# Patient Record
Sex: MALE | Race: WHITE | Employment: FULL TIME | URBAN - METROPOLITAN AREA
[De-identification: names, ages, dates, MRNs, and addresses within clinical notes are randomized per-mention and may not be internally consistent; named-entity substitution may affect disease eponyms.]

---

## 2017-06-21 ENCOUNTER — APPOINTMENT (OUTPATIENT)
Dept: GENERAL RADIOLOGY | Age: 47
End: 2017-06-21
Attending: EMERGENCY MEDICINE
Payer: COMMERCIAL

## 2017-06-21 ENCOUNTER — HOSPITAL ENCOUNTER (OUTPATIENT)
Age: 47
Discharge: HOME | End: 2017-06-21
Attending: EMERGENCY MEDICINE
Payer: COMMERCIAL

## 2017-06-21 VITALS
DIASTOLIC BLOOD PRESSURE: 89 MMHG | RESPIRATION RATE: 16 BRPM | BODY MASS INDEX: 24.38 KG/M2 | TEMPERATURE: 99 F | SYSTOLIC BLOOD PRESSURE: 146 MMHG | WEIGHT: 180 LBS | OXYGEN SATURATION: 98 % | HEIGHT: 72 IN | HEART RATE: 64 BPM

## 2017-06-21 DIAGNOSIS — L97.521 FOOT ULCER, LEFT, LIMITED TO BREAKDOWN OF SKIN (HCC): ICD-10-CM

## 2017-06-21 DIAGNOSIS — L03.116 CELLULITIS OF LEFT LOWER EXTREMITY: Primary | ICD-10-CM

## 2017-06-21 PROCEDURE — 99204 OFFICE O/P NEW MOD 45 MIN: CPT

## 2017-06-21 PROCEDURE — 96365 THER/PROPH/DIAG IV INF INIT: CPT

## 2017-06-21 PROCEDURE — 87147 CULTURE TYPE IMMUNOLOGIC: CPT | Performed by: EMERGENCY MEDICINE

## 2017-06-21 PROCEDURE — 87070 CULTURE OTHR SPECIMN AEROBIC: CPT | Performed by: EMERGENCY MEDICINE

## 2017-06-21 PROCEDURE — 87186 SC STD MICRODIL/AGAR DIL: CPT | Performed by: EMERGENCY MEDICINE

## 2017-06-21 PROCEDURE — 73630 X-RAY EXAM OF FOOT: CPT | Performed by: EMERGENCY MEDICINE

## 2017-06-21 PROCEDURE — 87205 SMEAR GRAM STAIN: CPT | Performed by: EMERGENCY MEDICINE

## 2017-06-21 PROCEDURE — S0077 INJECTION, CLINDAMYCIN PHOSP: HCPCS

## 2017-06-21 RX ORDER — CLINDAMYCIN HYDROCHLORIDE 300 MG/1
300 CAPSULE ORAL 4 TIMES DAILY
Qty: 40 CAPSULE | Refills: 0 | Status: SHIPPED | OUTPATIENT
Start: 2017-06-21 | End: 2017-06-23

## 2017-06-21 RX ORDER — IBUPROFEN 200 MG
200 TABLET ORAL EVERY 6 HOURS PRN
COMMUNITY
End: 2018-08-22 | Stop reason: ALTCHOICE

## 2017-06-21 RX ORDER — CLINDAMYCIN PHOSPHATE 600 MG/50ML
600 INJECTION INTRAVENOUS ONCE
Status: COMPLETED | OUTPATIENT
Start: 2017-06-21 | End: 2017-06-21

## 2017-06-21 NOTE — ED INITIAL ASSESSMENT (HPI)
Foot ulcer to left foot at the arch that thought was healed up from 6 months ago. Then noticed last Thursday next to where the ulcer was a type of blister that looked full of blood. That has since opened on Monday and has been draining.  Now having some swe

## 2017-06-21 NOTE — ED PROVIDER NOTES
Patient presents with:  Cellulitis (integumentary, infectious)    HPI:     Lewis Ocampo is a 48year old male who presents with chief complaint of foot infection.   Hx of spina bifida, L ankle fusion and prior toe/metatarsal amputations due to osteomyeliti (As transcribed by Technologist)  Patient complains of medial plantar abscess by the arch of the left foot with redness and swelling. Patient has a history of multiple foot surgeries.     FINDINGS:   There are postsurgical changes involving the distal left

## 2017-06-23 RX ORDER — CEPHALEXIN 500 MG/1
500 CAPSULE ORAL 4 TIMES DAILY
Qty: 40 CAPSULE | Refills: 0 | Status: SHIPPED | OUTPATIENT
Start: 2017-06-23 | End: 2017-07-03

## 2017-06-23 NOTE — ED NOTES
Pt called today with complaint that clindamycin is giving him bad diarrhea and gas. States wound is much better but he is very uncomfortable. Discussed symptoms with Dr Hali Stern. Culture sensitivity is not resulted yet.  Will change pt to keflex and await se

## 2017-06-24 NOTE — ED NOTES
Trying to reach patient, however, his mailbox for phone is full and unable to leave message. Called his sister he has listed on his contacts in the chart and left a message to call back if she knows of any other number to try.  Will continue to try and call

## 2017-06-26 ENCOUNTER — OFFICE VISIT (OUTPATIENT)
Dept: FAMILY MEDICINE CLINIC | Facility: CLINIC | Age: 47
End: 2017-06-26

## 2017-06-26 ENCOUNTER — TELEPHONE (OUTPATIENT)
Dept: FAMILY MEDICINE CLINIC | Facility: CLINIC | Age: 47
End: 2017-06-26

## 2017-06-26 VITALS
BODY MASS INDEX: 26.88 KG/M2 | RESPIRATION RATE: 16 BRPM | SYSTOLIC BLOOD PRESSURE: 130 MMHG | HEIGHT: 71 IN | TEMPERATURE: 99 F | WEIGHT: 192 LBS | DIASTOLIC BLOOD PRESSURE: 86 MMHG | HEART RATE: 82 BPM

## 2017-06-26 DIAGNOSIS — R19.7 DIARRHEA, UNSPECIFIED TYPE: Primary | ICD-10-CM

## 2017-06-26 DIAGNOSIS — L02.619 CELLULITIS AND ABSCESS OF FOOT: ICD-10-CM

## 2017-06-26 DIAGNOSIS — L03.119 CELLULITIS AND ABSCESS OF FOOT: ICD-10-CM

## 2017-06-26 PROCEDURE — 99203 OFFICE O/P NEW LOW 30 MIN: CPT | Performed by: FAMILY MEDICINE

## 2017-06-26 NOTE — PROGRESS NOTES
HPI:    Patient ID: Vinnie Salas is a 48year old male. Patient presents with: Follow - Up: left foot cellulitis-IC visit, stopped ABX    HPI  F/u on cellulitis of left foot. Started on clindamycin but discontinued after 1 day due to diarrhea.  Then med Constitutional: He appears well-nourished. No distress. HENT:   Mouth/Throat: Oropharynx is clear and moist.   Eyes: Conjunctivae are normal.   Cardiovascular: Normal rate. No murmur heard.   Pulmonary/Chest: Effort normal and breath sounds normal.

## 2017-06-26 NOTE — PATIENT INSTRUCTIONS
BRAT diet  Bananas  Rice  Apples  Toast    Chicken broth  Water. Gatorade. 1-2 teaspoons of yogurt. Complete ciprofloxacin course. If loose stools persist, drop off stool sample for testing. Call Dr. Carolee Weeks on Wednesday with update.     Follow up

## 2017-06-26 NOTE — TELEPHONE ENCOUNTER
MARC Bernard  Patient seen in [address] [place] [address] 6/21/17 - dx with cellulitis  Patient has had loose stool since last Thursday. Patient currently taking cipro 500 mg 2x daily. Previously taking clindamycin and keflex but changed due to stomach upset and culture results.

## 2017-07-11 ENCOUNTER — TELEPHONE (OUTPATIENT)
Dept: FAMILY MEDICINE CLINIC | Facility: CLINIC | Age: 47
End: 2017-07-11

## 2017-07-12 NOTE — TELEPHONE ENCOUNTER
[phone] (home)   [facility]. Has wound healed? Diarrhea resolved? Paperwork ready to fax.

## 2017-07-13 ENCOUNTER — MED REC SCAN ONLY (OUTPATIENT)
Dept: FAMILY MEDICINE CLINIC | Facility: CLINIC | Age: 47
End: 2017-07-13

## 2017-07-13 NOTE — TELEPHONE ENCOUNTER
Pt stated his wound is healing. Skin has closed, no drainage, area is scabbing. Pt stated his diarrhea has resolved. Pt stated the BRAT diet helped with the diarrhea. Pt is still looking for a podiatrist that suits his work schedule.   On [facility] paperwork

## 2017-08-01 ENCOUNTER — MED REC SCAN ONLY (OUTPATIENT)
Dept: FAMILY MEDICINE CLINIC | Facility: CLINIC | Age: 47
End: 2017-08-01

## 2018-08-20 ENCOUNTER — TELEPHONE (OUTPATIENT)
Dept: FAMILY MEDICINE CLINIC | Facility: CLINIC | Age: 48
End: 2018-08-20

## 2018-08-22 ENCOUNTER — TELEPHONE (OUTPATIENT)
Dept: FAMILY MEDICINE CLINIC | Facility: CLINIC | Age: 48
End: 2018-08-22

## 2018-08-22 ENCOUNTER — OFFICE VISIT (OUTPATIENT)
Dept: FAMILY MEDICINE CLINIC | Facility: CLINIC | Age: 48
End: 2018-08-22

## 2018-08-22 VITALS
SYSTOLIC BLOOD PRESSURE: 160 MMHG | HEIGHT: 71 IN | DIASTOLIC BLOOD PRESSURE: 98 MMHG | HEART RATE: 78 BPM | BODY MASS INDEX: 27.44 KG/M2 | RESPIRATION RATE: 20 BRPM | TEMPERATURE: 98 F | WEIGHT: 196 LBS

## 2018-08-22 DIAGNOSIS — T14.8XXA BLOOD BLISTER: ICD-10-CM

## 2018-08-22 DIAGNOSIS — T14.8XXA BLOOD BLISTER: Primary | ICD-10-CM

## 2018-08-22 DIAGNOSIS — R03.0 ELEVATED BP WITHOUT DIAGNOSIS OF HYPERTENSION: ICD-10-CM

## 2018-08-22 DIAGNOSIS — L84 CALLUS OF FOOT: Primary | ICD-10-CM

## 2018-08-22 DIAGNOSIS — L84 CALLUS OF FOOT: ICD-10-CM

## 2018-08-22 LAB
EST. AVERAGE GLUCOSE BLD GHB EST-MCNC: 103 MG/DL (ref 68–126)
HBA1C MFR BLD HPLC: 5.2 % (ref ?–5.7)

## 2018-08-22 PROCEDURE — 99213 OFFICE O/P EST LOW 20 MIN: CPT | Performed by: FAMILY MEDICINE

## 2018-08-22 PROCEDURE — 83036 HEMOGLOBIN GLYCOSYLATED A1C: CPT | Performed by: FAMILY MEDICINE

## 2018-08-22 NOTE — TELEPHONE ENCOUNTER
Pt called lvm stated Dr. Kinsey Vela referred him to see Dr. Jacob Par[facility] is not in network with his Ins so he needs to be referred to another podiatrist that is in network.

## 2018-08-22 NOTE — TELEPHONE ENCOUNTER
Call to patient and advised that referral to Dr Corbin Longo was placed. Patient requested referral to Dr Vasile Pickering at [facility] in [place]. States that his insurance is covered there and has been a patient there before.  [id number] [address] [place]

## 2018-08-22 NOTE — TELEPHONE ENCOUNTER
Pt called back stated his old podatrist is in network so he would like the referral sent to [facility] in [place] Dr. Adonis Simpson.

## 2018-08-22 NOTE — PROGRESS NOTES
HPI:    Patient ID: Sarah Sylvester is a 46year old male. Patient presents with:  Blisters: left foot    HPI  Pt with hx of spina bifida here to check on blister on left foot  Onset: 7 days. Resolving. No drainage. Not painful. No other concerns today. (primary encounter diagnosis)  Callus of foot  Elevated bp without diagnosis of hypertension  Blood blister: resolving  Callus: refer to podiatry for tx, Dr. Alissa Guadarrama  BP high: initial and repeat BP high. F/U for RN BP check next week. Monitor.  Low salt die

## 2018-08-22 NOTE — TELEPHONE ENCOUNTER
Call from patient. Patient is IHP. States that Dr Margaretta Felty is not in his network. Would we be able to referral him somewhere else? Advise. Thanks!

## 2018-08-23 DIAGNOSIS — T14.8XXA BLOOD BLISTER: Primary | ICD-10-CM

## 2018-08-23 PROCEDURE — 36415 COLL VENOUS BLD VENIPUNCTURE: CPT | Performed by: FAMILY MEDICINE

## 2018-08-28 ENCOUNTER — NURSE ONLY (OUTPATIENT)
Dept: FAMILY MEDICINE CLINIC | Facility: CLINIC | Age: 48
End: 2018-08-28

## 2018-08-28 VITALS — SYSTOLIC BLOOD PRESSURE: 160 MMHG | DIASTOLIC BLOOD PRESSURE: 88 MMHG

## 2018-08-28 DIAGNOSIS — Z02.9 ENCOUNTERS FOR ADMINISTRATIVE PURPOSE: Primary | ICD-10-CM

## 2018-10-13 ENCOUNTER — PATIENT OUTREACH (OUTPATIENT)
Dept: FAMILY MEDICINE CLINIC | Facility: CLINIC | Age: 48
End: 2018-10-13

## 2018-11-09 ENCOUNTER — OFFICE VISIT (OUTPATIENT)
Dept: WOUND CARE | Facility: HOSPITAL | Age: 48
End: 2018-11-09
Attending: SURGERY
Payer: COMMERCIAL

## 2018-11-09 DIAGNOSIS — L97.522 ULCER OF LEFT FOOT, WITH FAT LAYER EXPOSED (HCC): Primary | ICD-10-CM

## 2018-11-09 PROCEDURE — 97162 PT EVAL MOD COMPLEX 30 MIN: CPT

## 2018-11-09 PROCEDURE — 97597 DBRDMT OPN WND 1ST 20 CM/<: CPT

## 2018-11-09 NOTE — PROGRESS NOTES
Subjective    Chief Complaint  This information was obtained from the patient  11/9/2018 \"I had a wound in the same spot in the past and I used a wound vac and it healed in 2-3 months but it was a lot bigger\".      Allergies  latex (Reaction: rash), radio Wound #1 Medial, Dorsal Foot is an acute Stage 3 Pressure Injury Pressure Ulcer and has received a status of Not Healed.  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

## 2018-11-16 ENCOUNTER — OFFICE VISIT (OUTPATIENT)
Dept: WOUND CARE | Facility: HOSPITAL | Age: 48
End: 2018-11-16
Attending: SURGERY
Payer: COMMERCIAL

## 2018-11-16 DIAGNOSIS — L97.522 ULCER OF LEFT FOOT, WITH FAT LAYER EXPOSED (HCC): Primary | ICD-10-CM

## 2018-11-16 PROCEDURE — 97597 DBRDMT OPN WND 1ST 20 CM/<: CPT

## 2018-11-16 NOTE — PROGRESS NOTES
Subjective    Chief Complaint  This information was obtained from the patient  11/9/2018 \"I had a wound in the same spot in the past and I used a wound vac and it healed in 2-3 months but it was a lot bigger\".  11/16/2018 \"I feel good no problems and I l Wound #1 Medial, Dorsal Foot is an acute Stage 3 Pressure Injury Pressure Ulcer and has received a status of Not Healed.  Initial wound encounter measurements are 1.2cm length x 0.3cm width x 0.3cm depth, with an area of 0.36 sq cm and a volume of 0.108 cub Facility: Outpatient  Debridement Performed for Assessment: Wound #1 Medial, Dorsal Foot  Performed By: Physician ,  Debridement: Selective  Photos  Photo  Post Debridement Measurements  Length: (cm) 1.2  Width: (cm) 0.4  Depth: (cm) 0.3  Stage: Stage 3 Pr

## 2018-11-21 ENCOUNTER — OFFICE VISIT (OUTPATIENT)
Dept: WOUND CARE | Facility: HOSPITAL | Age: 48
End: 2018-11-21
Attending: SURGERY
Payer: COMMERCIAL

## 2018-11-21 DIAGNOSIS — L97.522 ULCER OF LEFT FOOT, WITH FAT LAYER EXPOSED (HCC): Primary | ICD-10-CM

## 2018-11-21 PROCEDURE — 97597 DBRDMT OPN WND 1ST 20 CM/<: CPT

## 2018-11-21 NOTE — PROGRESS NOTES
Subjective    Chief Complaint  This information was obtained from the patient  11/21/2018: Pt notes no changes.      Allergies  latex (Reaction: rash), radiology contrast lodinated, dyes anaphylaxis, shellfish derived, sulfa antibiotics    HPI  This informa The periwound skin exhibited: Callus, Dry/Scaly.  The periwound skin did not exhibit: Brawny Induration, Edema, Excoriation, Induration, Crepitus, Fluctuance, Friable, Rash, Moist, Maceration, Atrophie Juju, Cyanosis, Ecchymosis, Erythema, Hemosiderosis, Applied Secondary Wound Dressing. using Foam lite bordered dressing (1)  Dressing secured with non-allergenic tape/stockinet/wrap. using Medipore (1)  Applied Offloading device.  using 1/4\" foam padding applied to periwound (1)  Selective / Non-selective d

## 2018-11-30 ENCOUNTER — OFFICE VISIT (OUTPATIENT)
Dept: WOUND CARE | Facility: HOSPITAL | Age: 48
End: 2018-11-30
Attending: SURGERY
Payer: COMMERCIAL

## 2018-11-30 PROCEDURE — 97597 DBRDMT OPN WND 1ST 20 CM/<: CPT

## 2018-11-30 NOTE — PROGRESS NOTES
Called to follow up with pt since pt has moved. He will be estab with PCP closer to home. BP checked weekly at wound care and has been 140s/80-90.

## 2018-11-30 NOTE — PROGRESS NOTES
Subjective    Chief Complaint  This information was obtained from the patient  11/21/2018: Pt notes no changes. 11/30/2018 \"I feel pretty good no problems\".      Allergies  latex (Reaction: rash), radiology contrast lodinated, dyes anaphylaxis, shellfish The periwound skin exhibited: Callus, Dry/Scaly.  The periwound skin did not exhibit: Brawny Induration, Edema, Excoriation, Induration, Crepitus, Fluctuance, Friable, Rash, Moist, Maceration, Atrophie Juju, Cyanosis, Ecchymosis, Erythema, Hemosiderosis, Total Area Debrided: (sq cm) 0.21  Volume: (cm³) 0.063  Tissue and other material debrided:  Dermis, Epidermis  Devitalized Tissue Debrided:  Biofilm, Exudate, Fibrin, Necrotic/Eschar, Slough  Instrument: Blade, Forceps  Bleeding: Minimal  Procedural Pain:

## 2018-12-05 ENCOUNTER — TELEPHONE (OUTPATIENT)
Dept: FAMILY MEDICINE CLINIC | Facility: CLINIC | Age: 48
End: 2018-12-05

## 2018-12-05 DIAGNOSIS — L97.522 NON-PRESSURE CHRONIC ULCER OF OTHER PART OF LEFT FOOT WITH FAT LAYER EXPOSED (HCC): Primary | ICD-10-CM

## 2018-12-05 NOTE — TELEPHONE ENCOUNTER
[facility] referral for Mechanical negative pressure for diabetic foot ulcer.   Originally referred by his podiatrist.  Dx - D56.438    Note of 8/28/18 - patient is going to establish with a PCP closer to

## 2018-12-05 NOTE — TELEPHONE ENCOUNTER
Spoke to Villa daya at [facility]. Need to put the order in under Surgery and put details in the note. Per David Christiansen at [facility] - need to make status \"Incomplete\" for review of service prior to authorization.

## 2018-12-07 ENCOUNTER — OFFICE VISIT (OUTPATIENT)
Dept: WOUND CARE | Facility: HOSPITAL | Age: 48
End: 2018-12-07
Attending: SURGERY
Payer: COMMERCIAL

## 2018-12-07 DIAGNOSIS — L97.522 ULCER OF LEFT FOOT, WITH FAT LAYER EXPOSED (HCC): Primary | ICD-10-CM

## 2018-12-07 PROCEDURE — 97597 DBRDMT OPN WND 1ST 20 CM/<: CPT

## 2018-12-14 ENCOUNTER — OFFICE VISIT (OUTPATIENT)
Dept: WOUND CARE | Facility: HOSPITAL | Age: 48
End: 2018-12-14
Attending: SURGERY
Payer: COMMERCIAL

## 2018-12-14 DIAGNOSIS — L97.522 ULCER OF LEFT FOOT, WITH FAT LAYER EXPOSED (HCC): ICD-10-CM

## 2018-12-14 PROCEDURE — 97597 DBRDMT OPN WND 1ST 20 CM/<: CPT

## 2018-12-14 PROCEDURE — 29581 APPL MULTLAYER CMPRN SYS LEG: CPT

## 2018-12-14 NOTE — PROGRESS NOTES
Subjective    Chief Complaint  This information was obtained from the patient  11/21/2018: Pt notes no changes. 11/30/2018 \"I feel pretty good no problems\".  12/7/2018 \" I will ask for additional referrals from my PCP like you asked, the wound looks smal The periwound skin exhibited: Callus, Dry/Scaly.  The periwound skin did not exhibit: Brawny Induration, Edema, Excoriation, Induration, Crepitus, Fluctuance, Friable, Rash, Moist, Maceration, Atrophie Juju, Cyanosis, Ecchymosis, Erythema, Hemosiderosis, Debridement Performed for Assessment: Wound #1 Left, Medial, Dorsal Foot  Performed By: Physician ,  Debridement: Selective  Post Debridement Measurements  Length: (cm) 0.5  Width: (cm) 0.2  Depth: (cm) 3  Stage: Stage 3 Pressure Injury      Area: (cm²) 0.

## 2018-12-21 ENCOUNTER — OFFICE VISIT (OUTPATIENT)
Dept: WOUND CARE | Facility: HOSPITAL | Age: 48
End: 2018-12-21
Attending: NURSE PRACTITIONER
Payer: COMMERCIAL

## 2018-12-21 DIAGNOSIS — L97.522 ULCER OF LEFT FOOT, WITH FAT LAYER EXPOSED (HCC): Primary | ICD-10-CM

## 2018-12-21 PROCEDURE — 97597 DBRDMT OPN WND 1ST 20 CM/<: CPT

## 2018-12-21 NOTE — PROGRESS NOTES
Chief Complaint  This information was obtained from the patient  11/21/2018: Pt notes no changes. 11/30/2018 \"I feel pretty good no problems\". 12/7/2018 \" I will ask for additional referrals from my PCP like you asked, the wound looks smaller to me\".  1 Wound #1 Left, Medial, Dorsal Foot is an acute Stage 3 Pressure Injury Pressure Ulcer and has received a status of Not Healed.  Subsequent wound encounter measurements are 0.6cm length x 0.2cm width x 0.3cm depth, with an area of 0.12 sq cm and a volume of Wound #1 (Pressure Ulcer) is located on the left, medial, dorsal foot. A selective debridement with a total area debrided of 0.21 sq cm Dermis and Epidermis were removed along with devitalized tissue: biofilm, callus, exudate, fibrin, and slough.  The follo

## 2018-12-28 ENCOUNTER — OFFICE VISIT (OUTPATIENT)
Dept: WOUND CARE | Facility: HOSPITAL | Age: 48
End: 2018-12-28
Attending: NURSE PRACTITIONER
Payer: COMMERCIAL

## 2018-12-28 DIAGNOSIS — L97.522 ULCER OF LEFT FOOT, WITH FAT LAYER EXPOSED (HCC): Primary | ICD-10-CM

## 2018-12-28 PROCEDURE — 97597 DBRDMT OPN WND 1ST 20 CM/<: CPT

## 2018-12-28 NOTE — PROGRESS NOTES
Subjective    Chief Complaint  This information was obtained from the patient  12/28/2018 \"The foot is a little less wet but still the dressing gets damp\".      Allergies  latex (Reaction: rash), radiology contrast lodinated, dyes anaphylaxis, shellfish d The periwound skin exhibited: Callus, Moist, Maceration.  The periwound skin did not exhibit: Brawny Induration, Edema, Excoriation, Induration, Crepitus, Fluctuance, Friable, Rash, Dry/Scaly, Atrophie Juju, Cyanosis, Ecchymosis, Erythema, Hemosiderosis, Total Area Debrided: (sq cm) 0.1  Volume: (cm³) 0.01  Tissue and other material debrided:  Dermis, Epidermis  Devitalized Tissue Debrided:  Callus, Exudate, Fibrin, Necrotic/Eschar, Slough  Instrument: Curette  Bleeding:  Moderate  Hemostasis Achieved: Silv

## 2019-01-04 ENCOUNTER — OFFICE VISIT (OUTPATIENT)
Dept: WOUND CARE | Facility: HOSPITAL | Age: 49
End: 2019-01-04
Attending: NURSE PRACTITIONER
Payer: COMMERCIAL

## 2019-01-04 DIAGNOSIS — L97.522 ULCER OF LEFT FOOT, WITH FAT LAYER EXPOSED (HCC): Primary | ICD-10-CM

## 2019-01-04 PROCEDURE — 97597 DBRDMT OPN WND 1ST 20 CM/<: CPT

## 2019-01-04 NOTE — PROGRESS NOTES
Subjective    Chief Complaint  This information was obtained from the patient  12/28/2018 \"The foot is a little less wet but still the dressing gets damp\".  1/4/2019 \"I feel ok and the wound looks smaller, not sure why my foot is moist I do not remember The periwound skin exhibited: Callus, Moist, Maceration.  The periwound skin did not exhibit: Brawny Induration, Edema, Excoriation, Induration, Crepitus, Fluctuance, Friable, Rash, Dry/Scaly, Atrophie Juju, Cyanosis, Ecchymosis, Erythema, Hemosiderosis, Post Procedural Pain: Insensate  Response to Treatment: Procedure was tolerated well      Entered By: Lilly Vizcaino on 46/[id number]/7638 3:37:23 PM   Signature(s): Date(s):

## 2019-01-11 ENCOUNTER — OFFICE VISIT (OUTPATIENT)
Dept: WOUND CARE | Facility: HOSPITAL | Age: 49
End: 2019-01-11
Attending: NURSE PRACTITIONER
Payer: COMMERCIAL

## 2019-01-11 DIAGNOSIS — L97.522 ULCER OF LEFT FOOT, WITH FAT LAYER EXPOSED (HCC): Primary | ICD-10-CM

## 2019-01-11 PROCEDURE — 97597 DBRDMT OPN WND 1ST 20 CM/<: CPT

## 2019-01-11 NOTE — PROGRESS NOTES
Subjective    Chief Complaint  This information was obtained from the patient  12/28/2018 \"The foot is a little less wet but still the dressing gets damp\".  1/4/2019 \"I feel ok and the wound looks smaller, not sure why my foot is moist I do not remember Wound #1 Left, Medial, Dorsal Foot is an acute Stage 3 Pressure Injury Pressure Ulcer and has received a status of Not Healed.  Subsequent wound encounter measurements are 0.2cm length x 0.2cm width x 0.1cm depth, with an area of 0.04 sq cm and a volume of Wound #1 (Pressure Ulcer) is located on the left, medial, dorsal foot. A selective debridement with a total area debrided of 0.04 sq cm was performed by Lázaro Vela PT.  Epidermis was removed along with devitalized tissue: biofilm, callus, exudate, necr

## 2019-01-18 ENCOUNTER — OFFICE VISIT (OUTPATIENT)
Dept: WOUND CARE | Facility: HOSPITAL | Age: 49
End: 2019-01-18
Attending: NURSE PRACTITIONER
Payer: COMMERCIAL

## 2019-01-18 DIAGNOSIS — L97.522 ULCER OF LEFT FOOT, WITH FAT LAYER EXPOSED (HCC): ICD-10-CM

## 2019-01-18 PROCEDURE — 99213 OFFICE O/P EST LOW 20 MIN: CPT

## 2019-01-18 NOTE — PROGRESS NOTES
Subjective    Chief Complaint  This information was obtained from the patient  1/18/2019 \"I feel good and the foot feel more dry than the last couple of weeks\".      Allergies  latex (Reaction: rash), radiology contrast lodinated, dyes anaphylaxis, shellf Pt presents with improved wound area and volume and the exudation is minimal. The wound was debrided of nonviable tissue to promote closure and 100% resolution.  A tiny piece of Fibrocal was applied to the wound bed to promote approximation of the wound edg

## 2019-01-25 ENCOUNTER — OFFICE VISIT (OUTPATIENT)
Dept: WOUND CARE | Facility: HOSPITAL | Age: 49
End: 2019-01-25
Attending: NURSE PRACTITIONER
Payer: COMMERCIAL

## 2019-01-25 DIAGNOSIS — L97.522 ULCER OF LEFT FOOT, WITH FAT LAYER EXPOSED (HCC): Primary | ICD-10-CM

## 2019-01-25 PROCEDURE — 97597 DBRDMT OPN WND 1ST 20 CM/<: CPT

## 2019-01-25 NOTE — PROGRESS NOTES
Subjective    Chief Complaint  This information was obtained from the patient  1/18/2019 \"I feel good and the foot feel more dry than the last couple of weeks\". 1/25/2019 \"The wound is not really draining anymore\".      Allergies  latex (Reaction: rash) The periwound skin exhibited: Callus, Moist, Maceration.  The periwound skin did not exhibit: Brawny Induration, Edema, Excoriation, Induration, Crepitus, Fluctuance, Friable, Rash, Dry/Scaly, Atrophie Juju, Cyanosis, Ecchymosis, Erythema, Hemosiderosis, Biofilm, Callus, Exudate, Fibrin, Necrotic/Eschar, Slough  Instrument: Blade, Curette  Bleeding: None  Procedural Pain: Insensate  Post Procedural Pain: Insensate  Response to Treatment: Procedure was tolerated well      Entered By: Eryn Valdez on 91/01

## 2019-02-01 ENCOUNTER — OFFICE VISIT (OUTPATIENT)
Dept: WOUND CARE | Facility: HOSPITAL | Age: 49
End: 2019-02-01
Attending: NURSE PRACTITIONER
Payer: COMMERCIAL

## 2019-02-01 DIAGNOSIS — L97.522 ULCER OF LEFT FOOT, WITH FAT LAYER EXPOSED (HCC): Primary | ICD-10-CM

## 2019-02-01 PROCEDURE — 99213 OFFICE O/P EST LOW 20 MIN: CPT

## 2019-02-01 NOTE — PROGRESS NOTES
Subjective    Chief Complaint  This information was obtained from the patient  1/18/2019 \"I feel good and the foot feel more dry than the last couple of weeks\". 1/25/2019 \"The wound is not really draining anymore\".  2/1/2019 \"It does not seem to have a Wound #1 Left, Medial, Dorsal Foot is an acute Stage 3 Pressure Injury Pressure Ulcer and has received a status of Not Healed.  Subsequent wound encounter measurements are 0.2cm length x 0.3cm width x 0.1cm depth, with an area of 0.06 sq cm and a volume of

## 2019-02-08 ENCOUNTER — OFFICE VISIT (OUTPATIENT)
Dept: WOUND CARE | Facility: HOSPITAL | Age: 49
End: 2019-02-08
Attending: NURSE PRACTITIONER
Payer: COMMERCIAL

## 2019-02-08 DIAGNOSIS — L97.522 ULCER OF LEFT FOOT, WITH FAT LAYER EXPOSED (HCC): Primary | ICD-10-CM

## 2019-02-08 PROCEDURE — 99213 OFFICE O/P EST LOW 20 MIN: CPT

## 2019-02-08 NOTE — PROGRESS NOTES
Subjective    Chief Complaint  This information was obtained from the patient  1/18/2019 \"I feel good and the foot feel more dry than the last couple of weeks\". 1/25/2019 \"The wound is not really draining anymore\".  2/1/2019 \"It does not seem to have a The periwound skin exhibited: Callus, Moist. The periwound skin did not exhibit: Brawny Induration, Edema, Excoriation, Induration, Crepitus, Fluctuance, Friable, Rash, Dry/Scaly, Maceration, Atrophie Juju, Cyanosis, Ecchymosis, Erythema, Hemosiderosis,

## 2019-02-15 ENCOUNTER — APPOINTMENT (OUTPATIENT)
Dept: WOUND CARE | Facility: HOSPITAL | Age: 49
End: 2019-02-15
Attending: NURSE PRACTITIONER
Payer: COMMERCIAL

## 2019-02-22 ENCOUNTER — APPOINTMENT (OUTPATIENT)
Dept: WOUND CARE | Facility: HOSPITAL | Age: 49
End: 2019-02-22
Attending: NURSE PRACTITIONER
Payer: COMMERCIAL

## 2019-03-14 ENCOUNTER — OFFICE VISIT (OUTPATIENT)
Dept: FAMILY MEDICINE CLINIC | Facility: CLINIC | Age: 49
End: 2019-03-14

## 2019-03-14 DIAGNOSIS — M20.41 HAMMER TOE OF RIGHT FOOT: Primary | ICD-10-CM

## 2019-03-14 DIAGNOSIS — D49.2 ABNORMAL SKIN GROWTH: ICD-10-CM

## 2019-03-14 DIAGNOSIS — L97.509 CHRONIC FOOT ULCER, UNSPECIFIED LATERALITY, UNSPECIFIED ULCER STAGE (HCC): ICD-10-CM

## 2019-03-14 PROCEDURE — 87205 SMEAR GRAM STAIN: CPT | Performed by: FAMILY MEDICINE

## 2019-03-14 PROCEDURE — 87186 SC STD MICRODIL/AGAR DIL: CPT | Performed by: FAMILY MEDICINE

## 2019-03-14 PROCEDURE — 87070 CULTURE OTHR SPECIMN AEROBIC: CPT | Performed by: FAMILY MEDICINE

## 2019-03-14 PROCEDURE — 99213 OFFICE O/P EST LOW 20 MIN: CPT | Performed by: FAMILY MEDICINE

## 2019-03-14 PROCEDURE — 87077 CULTURE AEROBIC IDENTIFY: CPT | Performed by: FAMILY MEDICINE

## 2019-03-14 NOTE — PROGRESS NOTES
HPI:    Patient ID: Gilma Jimenez is a 46year old male. Patient presents with: Other: growth above right eye per patient     HPI  Growth above right eye brow. Onset: 4-5 months. Has gradually doubled in size.   Goes through cycles of bleeding-scabbing o ASSESSMENT/PLAN:   Hammer toe of right foot  (primary encounter diagnosis)  Chronic foot ulcer, unspecified laterality, unspecified ulcer stage (hcc)  Abnormal skin growth  Wound cx collected. Does not appear infected at this time. Chronic issue.   Refe

## 2019-03-22 VITALS
OXYGEN SATURATION: 96 % | BODY MASS INDEX: 27 KG/M2 | RESPIRATION RATE: 16 BRPM | HEART RATE: 76 BPM | TEMPERATURE: 99 F | SYSTOLIC BLOOD PRESSURE: 156 MMHG | DIASTOLIC BLOOD PRESSURE: 101 MMHG | WEIGHT: 190 LBS

## 2019-03-22 RX ORDER — LEVOFLOXACIN 500 MG/1
500 TABLET, FILM COATED ORAL DAILY
Qty: 7 TABLET | Refills: 0 | Status: SHIPPED | OUTPATIENT
Start: 2019-03-22 | End: 2019-03-29

## 2019-03-26 ENCOUNTER — LAB REQUISITION (OUTPATIENT)
Dept: LAB | Facility: HOSPITAL | Age: 49
End: 2019-03-26
Payer: COMMERCIAL

## 2019-03-26 PROCEDURE — 88305 TISSUE EXAM BY PATHOLOGIST: CPT | Performed by: DERMATOLOGY

## 2019-04-09 ENCOUNTER — TELEPHONE (OUTPATIENT)
Dept: FAMILY MEDICINE CLINIC | Facility: CLINIC | Age: 49
End: 2019-04-09

## 2019-04-09 DIAGNOSIS — C44.310 BASAL CELL CARCINOMA (BCC) OF SKIN OF FACE, UNSPECIFIED PART OF FACE: Primary | ICD-10-CM

## 2019-04-09 NOTE — TELEPHONE ENCOUNTER
Referral placed - patient has an appointment  Dr. Dasia Perry Thursday at [address] [place] [address] over eye  Referral pending approval with insurance. Awaiting determination - will fax once approval received.

## 2019-04-09 NOTE — TELEPHONE ENCOUNTER
Pt called stated he needs a referral to the dermatologist sent to the [employer] and [place] for consultation [place] [id number] and this needs to be faxed to [phone].

## 2019-04-10 NOTE — TELEPHONE ENCOUNTER
Called [place] in referrals. He said the referral will likely have to go to medical review because Dr. Paul Ohara is not in network with P. He recommended I contact the pt to notify him he will have to reschedule. Pt notified.   He will reschedule his appt

## 2019-04-10 NOTE — TELEPHONE ENCOUNTER
Patient asking if approval comes in that he be called. Office will reschedule his appointment if they don't have the approval by 1pm today.

## 2019-04-16 ENCOUNTER — TELEPHONE (OUTPATIENT)
Dept: FAMILY MEDICINE CLINIC | Facility: CLINIC | Age: 49
End: 2019-04-16

## 2019-04-16 DIAGNOSIS — C44.310 BASAL CELL CARCINOMA (BCC) OF SKIN OF FACE, UNSPECIFIED PART OF FACE: Primary | ICD-10-CM

## 2019-04-18 NOTE — TELEPHONE ENCOUNTER
Patient referred to Dr. Errol Alegre for basal cell carcinoma of the face  Medical director wondering if patient can be referred to an in network provider  Okay to refer to Dr. Brittany Banegas - plastics for this issue?     Per consult note Dr. Imelda Dacosta (dermatology) on 3

## 2019-04-19 NOTE — TELEPHONE ENCOUNTER
We need to check with in network derm on who specializes in Mohs procedure. We can check Dr. Dc Najera if they handle these cases.   We can also ask his current derm for recs

## 2019-04-19 NOTE — TELEPHONE ENCOUNTER
Called [facility] office. Dr. Myriam Beckre does this procedure.   Per Verbal Dr. Chris Renteria okay to place referral.    [facility] - need to notify patient

## 2019-04-30 NOTE — TELEPHONE ENCOUNTER
Patient states he is having the procedure tomorrow with Dr. Isis Heredia  Referral was put in the system already  Patient does not need the referral for Dr. Wesley Neil

## 2019-05-01 ENCOUNTER — TELEPHONE (OUTPATIENT)
Dept: FAMILY MEDICINE CLINIC | Facility: CLINIC | Age: 49
End: 2019-05-01

## 2019-05-01 NOTE — TELEPHONE ENCOUNTER
----- Message from Lance Boeck, RN sent at 5/1/2019 10:26 AM CDT -----  Regarding: Care gap  Due for b/p check

## 2019-05-01 NOTE — TELEPHONE ENCOUNTER
Per DB OV note on 3/14/19, she recommended pt f/u in a few weeks for a physical and fasting BW. Called pt, he said he would have to get back to us because he was busy getting a procedure at this time.

## 2019-05-03 ENCOUNTER — MED REC SCAN ONLY (OUTPATIENT)
Dept: FAMILY MEDICINE CLINIC | Facility: CLINIC | Age: 49
End: 2019-05-03

## 2019-05-21 ENCOUNTER — TELEPHONE (OUTPATIENT)
Dept: FAMILY MEDICINE CLINIC | Facility: CLINIC | Age: 49
End: 2019-05-21

## 2019-08-13 ENCOUNTER — MED REC SCAN ONLY (OUTPATIENT)
Dept: FAMILY MEDICINE CLINIC | Facility: CLINIC | Age: 49
End: 2019-08-13

## 2019-08-26 ENCOUNTER — HOSPITAL ENCOUNTER (OUTPATIENT)
Age: 49
Discharge: HOME | End: 2019-08-26
Attending: FAMILY MEDICINE
Payer: COMMERCIAL

## 2019-08-26 VITALS
HEART RATE: 78 BPM | OXYGEN SATURATION: 98 % | DIASTOLIC BLOOD PRESSURE: 97 MMHG | SYSTOLIC BLOOD PRESSURE: 158 MMHG | RESPIRATION RATE: 16 BRPM | TEMPERATURE: 99 F

## 2019-08-26 DIAGNOSIS — M62.830 SPASM OF LUMBAR PARASPINOUS MUSCLE: Primary | ICD-10-CM

## 2019-08-26 PROCEDURE — 99213 OFFICE O/P EST LOW 20 MIN: CPT

## 2019-08-26 PROCEDURE — 99214 OFFICE O/P EST MOD 30 MIN: CPT

## 2019-08-26 RX ORDER — METAXALONE 800 MG/1
800 TABLET ORAL 3 TIMES DAILY
Qty: 21 TABLET | Refills: 0 | Status: SHIPPED | OUTPATIENT
Start: 2019-08-26 | End: 2019-09-02

## 2019-08-26 RX ORDER — PREDNISONE 20 MG/1
40 TABLET ORAL DAILY
Qty: 10 TABLET | Refills: 0 | Status: SHIPPED | OUTPATIENT
Start: 2019-08-26 | End: 2019-08-31

## 2019-08-26 NOTE — ED PROVIDER NOTES
Patient Seen in: [id number] [facility]    History   Patient presents with:  Back Pain (musculoskeletal)    Stated Complaint: back pain     HPI    **22-year-old male presents to the immediate care today with chief complaints of low back pain, ri 08/26/19 1604 None (Room air)       Current:BP (!) 158/97   Pulse 78   Temp 98.5 °F (36.9 °C) (Temporal)   Resp 16   SpO2 98%         Physical Exam    General: Well-nourished, well hydrated. No acute distress. No pallor.  No tachypnea  HEENT: Head is normoc re-check        Medications Prescribed:  Discharge Medication List as of 8/26/2019  4:13 PM    START taking these medications    predniSONE 20 MG Oral Tab  Take 2 tablets (40 mg total) by mouth daily for 5 days. , Normal, Disp-10 tablet, R-0    Metaxalone (

## 2019-09-05 ENCOUNTER — PATIENT OUTREACH (OUTPATIENT)
Dept: FAMILY MEDICINE CLINIC | Facility: CLINIC | Age: 49
End: 2019-09-05

## 2020-02-28 ENCOUNTER — TELEPHONE (OUTPATIENT)
Dept: FAMILY MEDICINE CLINIC | Facility: CLINIC | Age: 50
End: 2020-02-28

## 2020-06-23 ENCOUNTER — TELEPHONE (OUTPATIENT)
Dept: FAMILY MEDICINE CLINIC | Facility: CLINIC | Age: 50
End: 2020-06-23

## 2020-06-23 ENCOUNTER — TELEMEDICINE (OUTPATIENT)
Dept: FAMILY MEDICINE CLINIC | Facility: CLINIC | Age: 50
End: 2020-06-23

## 2020-06-23 DIAGNOSIS — J30.89 ALLERGIC RHINITIS DUE TO OTHER ALLERGIC TRIGGER, UNSPECIFIED SEASONALITY: ICD-10-CM

## 2020-06-23 DIAGNOSIS — R03.0 ELEVATED BP WITHOUT DIAGNOSIS OF HYPERTENSION: ICD-10-CM

## 2020-06-23 DIAGNOSIS — J01.00 ACUTE MAXILLARY SINUSITIS, RECURRENCE NOT SPECIFIED: Primary | ICD-10-CM

## 2020-06-23 PROCEDURE — 99213 OFFICE O/P EST LOW 20 MIN: CPT | Performed by: FAMILY MEDICINE

## 2020-06-23 RX ORDER — FLUTICASONE PROPIONATE 50 MCG
2 SPRAY, SUSPENSION (ML) NASAL DAILY
Qty: 1 INHALER | Refills: 1 | Status: SHIPPED | OUTPATIENT
Start: 2020-06-23 | End: 2020-07-23

## 2020-06-23 RX ORDER — AZITHROMYCIN 250 MG/1
TABLET, FILM COATED ORAL
Qty: 6 TABLET | Refills: 0 | Status: SHIPPED | OUTPATIENT
Start: 2020-06-23 | End: 2020-06-28

## 2020-06-23 NOTE — TELEPHONE ENCOUNTER
PT called stated when he lays down it feels like he has fluid in his lungs and starts coughing but when he sits up he feels better. He stated when he increases his physical activity and his nose starts running.  He stated he does get short of breath sometim

## 2020-06-23 NOTE — PROGRESS NOTES
No chief complaint on file. cough and feeling BP is elevated. This visit is conducted using Telemedicine with live, interactive video and audio.     Patient understands and accepts financial responsibility for any deductible, co-insurance and/or co-pays PHYSICAL EXAM:    There were no vitals taken for this visit. Physical Exam       no vitals taken  Patient in no acute distress. He is alert and oriented. He is breathing normal.  ASSESSMENT/PLAN:   1.  Acute maxillary sinusitis, recurrence not specifi

## 2020-06-23 NOTE — TELEPHONE ENCOUNTER
Spoke to patient. Scheduled video visit for today.  Verified insurance  Future Appointments   Date Time Provider Jolene Amrita   6/23/2020  1:00 PM Joanna Amaya MD [facility] verbally consents to a Virtual/Telephone Check-In

## 2020-06-23 NOTE — TELEPHONE ENCOUNTER
Spoke to patient. When he lays down- starts having productive cough with mucous. Also feels like his Bp is elevated when he lays down. Has not taken BP. Also has productive cough with mucous in morning. This has been going on for 4 months.    Has tried

## 2020-12-01 ENCOUNTER — TELEMEDICINE (OUTPATIENT)
Dept: FAMILY MEDICINE CLINIC | Facility: CLINIC | Age: 50
End: 2020-12-01

## 2020-12-01 DIAGNOSIS — Z20.822 SUSPECTED COVID-19 VIRUS INFECTION: Primary | ICD-10-CM

## 2020-12-01 PROCEDURE — 99213 OFFICE O/P EST LOW 20 MIN: CPT | Performed by: FAMILY MEDICINE

## 2020-12-01 NOTE — PROGRESS NOTES
Patient presents with:  Fever       HPI:    Patient ID: Cinthia Staples is a 44year old male doing a video visit today. Sx onset: saturday  Headache and  Fever.  Tmax 100.8  Also has dry cough     No loss of taste/smell  [facility] or ordered in this encounter       Imaging & Referrals:  None

## 2020-12-07 ENCOUNTER — TELEPHONE (OUTPATIENT)
Dept: FAMILY MEDICINE CLINIC | Facility: CLINIC | Age: 50
End: 2020-12-07

## 2020-12-07 RX ORDER — AZITHROMYCIN 250 MG/1
TABLET, FILM COATED ORAL
Qty: 6 TABLET | Refills: 0 | Status: SHIPPED | OUTPATIENT
Start: 2020-12-07 | End: 2020-12-12

## 2020-12-07 NOTE — TELEPHONE ENCOUNTER
Patient states had Televisit 12/1 with Dr Rosi Fong. Got Covid test at state site and it was negative. states he feels a little better than at his televisit, but still having chest congestion, runny nose and productive cough.  States this has been going on sin

## 2020-12-07 NOTE — TELEPHONE ENCOUNTER
Pt did a virtual appointment with Dr. Kiki Valiente on 12/01 concerned about covid. Pt did get his results back he was negative. per pt Dr. Kiki Valiente told him to call back to see what the next steps will be.

## 2020-12-07 NOTE — TELEPHONE ENCOUNTER
Patient states no fever, no sore throat. States has feeling in throat like he keeps having to swallow. Chest congestion, worse in morning after laying down. Productive cough-worse in morning after laying down. Sinus congestion and pressure.

## 2020-12-08 NOTE — TELEPHONE ENCOUNTER
This is likely separate infection than what he had in June. Start zpak. If sx not better, we can eval further. Rx sent. LMTCB.

## 2021-02-22 ENCOUNTER — TELEMEDICINE (OUTPATIENT)
Dept: FAMILY MEDICINE CLINIC | Facility: CLINIC | Age: 51
End: 2021-02-22

## 2021-02-22 DIAGNOSIS — R05.9 COUGH: Primary | ICD-10-CM

## 2021-02-22 PROCEDURE — 99214 OFFICE O/P EST MOD 30 MIN: CPT | Performed by: FAMILY MEDICINE

## 2021-02-22 NOTE — PROGRESS NOTES
Patient presents with:  Cough       HPI:    Patient ID: Preston Baker is a 44year old male doing a video visit to discuss a cough he has has intermittently for 1 year. Cough is mostly in evening and morning. Feels lot of mucus in throat.  Cough is producti sx improve    No orders of the defined types were placed in this encounter.       Meds This Visit:  Requested Prescriptions      No prescriptions requested or ordered in this encounter       Imaging & Referrals:  ALLERGY - INTERNAL

## 2021-02-25 ENCOUNTER — HOSPITAL ENCOUNTER (OUTPATIENT)
Dept: GENERAL RADIOLOGY | Facility: HOSPITAL | Age: 51
Discharge: HOME | End: 2021-02-25
Attending: FAMILY MEDICINE
Payer: COMMERCIAL

## 2021-02-25 DIAGNOSIS — R05.9 COUGH: ICD-10-CM

## 2021-02-25 PROCEDURE — 71046 X-RAY EXAM CHEST 2 VIEWS: CPT | Performed by: FAMILY MEDICINE

## 2021-02-26 ENCOUNTER — TELEPHONE (OUTPATIENT)
Dept: FAMILY MEDICINE CLINIC | Facility: CLINIC | Age: 51
End: 2021-02-26

## 2021-02-26 NOTE — TELEPHONE ENCOUNTER
Pt notified and verbalized understanding. [facility] sent with allergy information.

## 2021-02-26 NOTE — TELEPHONE ENCOUNTER
----- Message from Lina Rhodes MD sent at 2/26/2021 12:44 AM CST -----  CXR wnl. F/U with allergist as discussed

## 2021-03-04 ENCOUNTER — NURSE ONLY (OUTPATIENT)
Dept: ALLERGY | Facility: CLINIC | Age: 51
End: 2021-03-04

## 2021-03-04 ENCOUNTER — TELEPHONE (OUTPATIENT)
Dept: FAMILY MEDICINE CLINIC | Facility: CLINIC | Age: 51
End: 2021-03-04

## 2021-03-04 ENCOUNTER — OFFICE VISIT (OUTPATIENT)
Dept: ALLERGY | Facility: CLINIC | Age: 51
End: 2021-03-04

## 2021-03-04 VITALS
SYSTOLIC BLOOD PRESSURE: 173 MMHG | DIASTOLIC BLOOD PRESSURE: 111 MMHG | RESPIRATION RATE: 18 BRPM | OXYGEN SATURATION: 98 % | HEART RATE: 85 BPM

## 2021-03-04 DIAGNOSIS — J30.89 PERENNIAL ALLERGIC RHINITIS: ICD-10-CM

## 2021-03-04 DIAGNOSIS — R05.3 CHRONIC COUGH: Primary | ICD-10-CM

## 2021-03-04 DIAGNOSIS — R05.3 CHRONIC COUGH: ICD-10-CM

## 2021-03-04 DIAGNOSIS — Z91.09 ENVIRONMENTAL ALLERGIES: ICD-10-CM

## 2021-03-04 PROCEDURE — 95004 PERQ TESTS W/ALRGNC XTRCS: CPT | Performed by: ALLERGY & IMMUNOLOGY

## 2021-03-04 PROCEDURE — 3080F DIAST BP >= 90 MM HG: CPT | Performed by: ALLERGY & IMMUNOLOGY

## 2021-03-04 PROCEDURE — 3077F SYST BP >= 140 MM HG: CPT | Performed by: ALLERGY & IMMUNOLOGY

## 2021-03-04 PROCEDURE — 99244 OFF/OP CNSLTJ NEW/EST MOD 40: CPT | Performed by: ALLERGY & IMMUNOLOGY

## 2021-03-04 RX ORDER — FLUTICASONE PROPIONATE 50 MCG
2 SPRAY, SUSPENSION (ML) NASAL DAILY
Qty: 1 BOTTLE | Refills: 0 | Status: SHIPPED | OUTPATIENT
Start: 2021-03-04

## 2021-03-04 RX ORDER — LEVOCETIRIZINE DIHYDROCHLORIDE 5 MG/1
5 TABLET, FILM COATED ORAL NIGHTLY
Qty: 30 TABLET | Refills: 0 | Status: SHIPPED | OUTPATIENT
Start: 2021-03-04 | End: 2021-10-02

## 2021-03-04 NOTE — TELEPHONE ENCOUNTER
Patient had an allergist appointment today.   BP high at visit  First /11  BP recheck 170/100    Denies all symptoms  Pt denies headache  Denies dizziness  Denies chest pain  Denies SOB   Denies muscle weakness  Patient has a BP cuff at home - patient

## 2021-03-04 NOTE — TELEPHONE ENCOUNTER
Pt went in to an appointment to the allergist and was told his BP was high of 180/110 they took it again it went to 170/100

## 2021-03-04 NOTE — PATIENT INSTRUCTIONS
Recs:  Handouts on chronic cough provided and reviewed including common triggers being postnasal drip  Handouts on rhinitis both allergic and nonallergic rhinitis provided and reviewed  Start Flonase 2 sprays per nostril once a day.   Advised to use on a da

## 2021-03-04 NOTE — PROGRESS NOTES
Kyler Arizmendi is a 44year old male. HPI:   Patient presents with:  Cough: Pt reports a consistent mucousy cough, especially when laying down. Reports that he feels \"like when he is laying down fluid goes to his back and he coughs\".  Reports that he wa History: Social History    Tobacco Use      Smoking status: Never Smoker      Smokeless tobacco: Never Used    Alcohol use: Yes      Comment: social    Drug use: No       Medications (Active prior to today's visit):  No current outpatient medications on fi unusual rashes present  Extremities: no edema, cyanosis, or clubbing  Neurological:Oriented to time, place, person & situation       ASSESSMENT/PLAN:   Assessment   Chronic cough  (primary encounter diagnosis)  Environmental allergies  Perennial allergic r provided today, they were provided solely for patient education and training related to self administration of these medications. Teaching, instruction and sample was provided to the patient by myself.   Teaching included  a review of potential adverse joni

## 2021-03-05 ENCOUNTER — OFFICE VISIT (OUTPATIENT)
Dept: FAMILY MEDICINE CLINIC | Facility: CLINIC | Age: 51
End: 2021-03-05

## 2021-03-05 VITALS
SYSTOLIC BLOOD PRESSURE: 158 MMHG | HEART RATE: 80 BPM | RESPIRATION RATE: 14 BRPM | DIASTOLIC BLOOD PRESSURE: 90 MMHG | WEIGHT: 205 LBS | TEMPERATURE: 99 F | BODY MASS INDEX: 29 KG/M2

## 2021-03-05 DIAGNOSIS — I10 ESSENTIAL HYPERTENSION: Primary | ICD-10-CM

## 2021-03-05 LAB
ALBUMIN SERPL-MCNC: 3.9 G/DL (ref 3.4–5)
ALBUMIN/GLOB SERPL: 1.1 {RATIO} (ref 1–2)
ALP LIVER SERPL-CCNC: 80 U/L
ALT SERPL-CCNC: 22 U/L
ANION GAP SERPL CALC-SCNC: 1 MMOL/L (ref 0–18)
AST SERPL-CCNC: 12 U/L (ref 15–37)
BILIRUB SERPL-MCNC: 0.4 MG/DL (ref 0.1–2)
BUN BLD-MCNC: 16 MG/DL (ref 7–18)
BUN/CREAT SERPL: 16.3 (ref 10–20)
CALCIUM BLD-MCNC: 8.8 MG/DL (ref 8.5–10.1)
CHLORIDE SERPL-SCNC: 107 MMOL/L (ref 98–112)
CO2 SERPL-SCNC: 32 MMOL/L (ref 21–32)
CREAT BLD-MCNC: 0.98 MG/DL
GLOBULIN PLAS-MCNC: 3.5 G/DL (ref 2.8–4.4)
GLUCOSE BLD-MCNC: 94 MG/DL (ref 70–99)
M PROTEIN MFR SERPL ELPH: 7.4 G/DL (ref 6.4–8.2)
OSMOLALITY SERPL CALC.SUM OF ELEC: 291 MOSM/KG (ref 275–295)
PATIENT FASTING Y/N/NP: YES
POTASSIUM SERPL-SCNC: 4.4 MMOL/L (ref 3.5–5.1)
SODIUM SERPL-SCNC: 140 MMOL/L (ref 136–145)

## 2021-03-05 PROCEDURE — 3077F SYST BP >= 140 MM HG: CPT | Performed by: FAMILY MEDICINE

## 2021-03-05 PROCEDURE — 3080F DIAST BP >= 90 MM HG: CPT | Performed by: FAMILY MEDICINE

## 2021-03-05 PROCEDURE — 80053 COMPREHEN METABOLIC PANEL: CPT | Performed by: FAMILY MEDICINE

## 2021-03-05 PROCEDURE — 99214 OFFICE O/P EST MOD 30 MIN: CPT | Performed by: FAMILY MEDICINE

## 2021-03-05 RX ORDER — LOSARTAN POTASSIUM 25 MG/1
25 TABLET ORAL EVERY MORNING
Qty: 30 TABLET | Refills: 0 | Status: SHIPPED | OUTPATIENT
Start: 2021-03-05 | End: 2021-03-12

## 2021-03-05 NOTE — PROGRESS NOTES
Patient presents with: Follow - Up: follow up on BP       HPI:    Patient ID: Ja Croft is a 44year old male here to discuss high BP. BP was high in specialists office earlier this week. BP was 170-180/90s.   Has had high readings at various points pa No family history on file.    Social History: Social History    Tobacco Use      Smoking status: Never Smoker      Smokeless tobacco: Never Used    Vaping Use      Vaping Use: Never used    Alcohol use: Not Currently      Comment: social    Drug use: N

## 2021-03-05 NOTE — TELEPHONE ENCOUNTER
Scheduled.   Future Appointments   Date Time Provider Jolene Crowe   3/5/2021 12:00 PM Roro Dickson MD [facility]   3/10/2021  1:30 PM MD ABIGAIL Alvarado [facility]

## 2021-03-09 ENCOUNTER — TELEPHONE (OUTPATIENT)
Dept: FAMILY MEDICINE CLINIC | Facility: CLINIC | Age: 51
End: 2021-03-09

## 2021-03-09 NOTE — TELEPHONE ENCOUNTER
----- Message from Asa Valero MD sent at 3/9/2021  1:20 PM CST -----  This pt has appt with me tomorrow but its suppose to be a RN appt for BP check.  Remind him to bring home machine also and take him off my schedule  Dr. Caitie Dodd

## 2021-03-09 NOTE — TELEPHONE ENCOUNTER
Call to patient. States he was already seen on 3/5/21 and was told to call back in 1 week with BP readings. See OV notes from 3/5/21    Pt will call back with home BP readings in 1 week.  Will adjust med as needed  Get annual px/cristiano BW scheduled as well

## 2021-03-12 ENCOUNTER — TELEPHONE (OUTPATIENT)
Dept: FAMILY MEDICINE CLINIC | Facility: CLINIC | Age: 51
End: 2021-03-12

## 2021-03-12 RX ORDER — LOSARTAN POTASSIUM 25 MG/1
25 TABLET ORAL 2 TIMES DAILY
Qty: 60 TABLET | Refills: 0 | COMMUNITY
Start: 2021-03-12 | End: 2021-04-06 | Stop reason: DRUGHIGH

## 2021-03-12 NOTE — TELEPHONE ENCOUNTER
Pt called said per Dr. Angy woods him to call in his BP reading.    03/07  2 pm 148/94   03/08/  4 pm 150/90  03/09   1 pm 147/90  03/10   2 pm 148/89  03/11  10 Am 144/83  03/12  9 am 145/84

## 2021-04-06 ENCOUNTER — PATIENT MESSAGE (OUTPATIENT)
Dept: FAMILY MEDICINE CLINIC | Facility: CLINIC | Age: 51
End: 2021-04-06

## 2021-04-06 ENCOUNTER — OFFICE VISIT (OUTPATIENT)
Dept: FAMILY MEDICINE CLINIC | Facility: CLINIC | Age: 51
End: 2021-04-06

## 2021-04-06 VITALS
DIASTOLIC BLOOD PRESSURE: 100 MMHG | SYSTOLIC BLOOD PRESSURE: 158 MMHG | RESPIRATION RATE: 18 BRPM | HEIGHT: 71 IN | WEIGHT: 200 LBS | BODY MASS INDEX: 28 KG/M2 | TEMPERATURE: 98 F | HEART RATE: 82 BPM | OXYGEN SATURATION: 98 %

## 2021-04-06 DIAGNOSIS — Z00.00 GENERAL MEDICAL EXAMINATION: Primary | ICD-10-CM

## 2021-04-06 DIAGNOSIS — I10 ESSENTIAL HYPERTENSION: ICD-10-CM

## 2021-04-06 PROCEDURE — 85025 COMPLETE CBC W/AUTO DIFF WBC: CPT | Performed by: FAMILY MEDICINE

## 2021-04-06 PROCEDURE — 3077F SYST BP >= 140 MM HG: CPT | Performed by: FAMILY MEDICINE

## 2021-04-06 PROCEDURE — 3008F BODY MASS INDEX DOCD: CPT | Performed by: FAMILY MEDICINE

## 2021-04-06 PROCEDURE — 99396 PREV VISIT EST AGE 40-64: CPT | Performed by: FAMILY MEDICINE

## 2021-04-06 PROCEDURE — 84443 ASSAY THYROID STIM HORMONE: CPT | Performed by: FAMILY MEDICINE

## 2021-04-06 PROCEDURE — 80061 LIPID PANEL: CPT | Performed by: FAMILY MEDICINE

## 2021-04-06 PROCEDURE — 3080F DIAST BP >= 90 MM HG: CPT | Performed by: FAMILY MEDICINE

## 2021-04-06 PROCEDURE — 83036 HEMOGLOBIN GLYCOSYLATED A1C: CPT | Performed by: FAMILY MEDICINE

## 2021-04-06 RX ORDER — LOSARTAN POTASSIUM 25 MG/1
25 TABLET ORAL 2 TIMES DAILY
Qty: 60 TABLET | Refills: 0 | Status: CANCELLED | OUTPATIENT
Start: 2021-04-06

## 2021-04-06 RX ORDER — LOSARTAN POTASSIUM 50 MG/1
50 TABLET ORAL DAILY
Qty: 90 TABLET | Refills: 1 | Status: SHIPPED | OUTPATIENT
Start: 2021-04-06 | End: 2021-07-09

## 2021-04-06 NOTE — PROGRESS NOTES
Echo Guido is a 44year old male who presents for a complete physical exam.   HPI:   No concerns today. F/U on HTN  Out of meds few weeks  Home BP: 130-140s/80s when on meds  No med s/e.           There is no immunization history on file for this jaden GENERAL: feels well   SKIN: denies any unusual skin lesions  EYES:denies blurred vision or double vision  HEENT: denies nasal congestion, sinus pain or ST  LUNGS: denies shortness of breath or cough with exertion  CARDIOVASCULAR: denies chest pain or pal

## 2021-04-07 ENCOUNTER — TELEPHONE (OUTPATIENT)
Dept: FAMILY MEDICINE CLINIC | Facility: CLINIC | Age: 51
End: 2021-04-07

## 2021-04-07 NOTE — TELEPHONE ENCOUNTER
PSA result from 4/6/21  Component   Ref Range & Units 4/6/21 12:15 PM   Prostate Specific Antigen Screen   <=4.00 ng/mL 2.22

## 2021-04-07 NOTE — TELEPHONE ENCOUNTER
From: Nia Dillard  To: Teresa Rodriguez MD  Sent: 4/6/2021 6:11 PM CDT  Subject: Test Results Question    I have a question about PSA SCREEN resulted on 4/6/21, 4:50 PM.    Is this result normal?

## 2021-04-07 NOTE — TELEPHONE ENCOUNTER
----- Message from Ceci Keenan MD sent at 4/7/2021 12:13 AM CDT -----  LDL chol slightly elevated. Control with low fat diet and exercise. Rest of BW wnl.  Repeat x 1 year

## 2021-04-16 ENCOUNTER — PATIENT MESSAGE (OUTPATIENT)
Dept: FAMILY MEDICINE CLINIC | Facility: CLINIC | Age: 51
End: 2021-04-16

## 2021-04-16 NOTE — TELEPHONE ENCOUNTER
From: Ja Croft  To: Davidson Gonzalez MD  Sent: 4/16/2021 10:10 AM CDT  Subject: Other    Good morning Dr. David Chester,    Here are my blood pressure readings, they are much better, thank you, and have a great day.     Wednesday 4/14    10am 139/88    7pm - 133/

## 2021-04-22 ENCOUNTER — TELEPHONE (OUTPATIENT)
Dept: FAMILY MEDICINE CLINIC | Facility: CLINIC | Age: 51
End: 2021-04-22

## 2021-04-22 DIAGNOSIS — D72.820 LYMPHOCYTOSIS: Primary | ICD-10-CM

## 2021-04-22 NOTE — TELEPHONE ENCOUNTER
----- Message from Maureen Mendosa MD sent at 4/22/2021 12:40 PM CDT -----  CBC showed lymphocytosis. Would like to repeat cbc to ensure back to normal. Schedule RN appt.

## 2021-04-26 ENCOUNTER — NURSE ONLY (OUTPATIENT)
Dept: FAMILY MEDICINE CLINIC | Facility: CLINIC | Age: 51
End: 2021-04-26

## 2021-04-26 DIAGNOSIS — D72.820 LYMPHOCYTOSIS: ICD-10-CM

## 2021-04-26 PROCEDURE — 85025 COMPLETE CBC W/AUTO DIFF WBC: CPT | Performed by: FAMILY MEDICINE

## 2021-04-26 NOTE — PROGRESS NOTES
Pito Briones presents today for nurse visit. Labs ordered by Dr Nellie Cho. 1 lavender drawn from right ac area with 1 attempt with straight needle. Patient tolerated well. Left office in stable condition.

## 2021-04-30 ENCOUNTER — TELEPHONE (OUTPATIENT)
Dept: FAMILY MEDICINE CLINIC | Facility: CLINIC | Age: 51
End: 2021-04-30

## 2021-04-30 DIAGNOSIS — D72.820 LYMPHOCYTOSIS: Primary | ICD-10-CM

## 2021-04-30 NOTE — TELEPHONE ENCOUNTER
----- Message from Laya Barraza MD sent at 4/30/2021 12:59 AM CDT -----  Still showing increased levels of lymphocytes. This can be due to many reasons. Rec consult with hematologist to determine cause.  Place referral for [employer]

## 2021-04-30 NOTE — TELEPHONE ENCOUNTER
Patient advised and verbalized understanding.   Contact information given - patient would like to see an [facility] hematologist  Referral updated

## 2021-04-30 NOTE — TELEPHONE ENCOUNTER
LMTCB    Referral placed.   Dr. Jeanie Bess  . [address], [address]    [phone]

## 2021-05-05 ENCOUNTER — TELEPHONE (OUTPATIENT)
Dept: FAMILY MEDICINE CLINIC | Facility: CLINIC | Age: 51
End: 2021-05-05

## 2021-05-05 DIAGNOSIS — L97.522 ULCER OF LEFT FOOT, WITH FAT LAYER EXPOSED (HCC): Primary | ICD-10-CM

## 2021-05-05 NOTE — TELEPHONE ENCOUNTER
----- Message from Laya Barraza MD sent at 5/4/2021 10:13 PM CDT -----  Regarding: RE: Referral Needed ASP  Okay to place referral  ----- Message -----  From: Sophie Perez  Sent: 5/4/2021  12:25 PM CDT  To: Laya Barraza MD, Nikunj Elena DPM  S

## 2021-05-06 NOTE — TELEPHONE ENCOUNTER
Left message for the pt to verify why he is seeing wound care    Called the wound care office to see if they have any other information on this patient- no other info available- will wait for the pt to call back to confirm what he needs to be seen for

## 2021-05-10 ENCOUNTER — TELEPHONE (OUTPATIENT)
Dept: FAMILY MEDICINE CLINIC | Facility: CLINIC | Age: 51
End: 2021-05-10

## 2021-05-10 NOTE — TELEPHONE ENCOUNTER
This order/referral was already placed today. Nisa Santana in referrals to discuss and LMTCB. Unsure what else is needed?

## 2021-05-10 NOTE — TELEPHONE ENCOUNTER
----- Message from Que Salgado MD sent at 5/10/2021 11:48 AM CDT -----  Regarding: RE: Referral Needed  Please take care of this  Dr. Ulises Pavon  ----- Message -----  From: Mercedes Tinsley  Sent: 5/10/2021  11:42 AM CDT  To: Que Salgado MD, [facility]

## 2021-05-17 DIAGNOSIS — M86.171 ACUTE OSTEOMYELITIS OF RIGHT FOOT (HCC): Primary | ICD-10-CM

## 2021-05-18 ENCOUNTER — OFFICE VISIT (OUTPATIENT)
Dept: WOUND CARE | Facility: HOSPITAL | Age: 51
End: 2021-05-18
Attending: NURSE PRACTITIONER
Payer: COMMERCIAL

## 2021-05-18 VITALS
WEIGHT: 190 LBS | TEMPERATURE: 97 F | DIASTOLIC BLOOD PRESSURE: 83 MMHG | SYSTOLIC BLOOD PRESSURE: 138 MMHG | BODY MASS INDEX: 25.73 KG/M2 | HEART RATE: 82 BPM | HEIGHT: 72 IN

## 2021-05-18 DIAGNOSIS — L97.522 ULCER OF LEFT FOOT, WITH FAT LAYER EXPOSED (HCC): ICD-10-CM

## 2021-05-18 DIAGNOSIS — L97.511 RIGHT FOOT ULCER, LIMITED TO BREAKDOWN OF SKIN (HCC): ICD-10-CM

## 2021-05-18 DIAGNOSIS — L84 CALLUS OF FOOT: Primary | ICD-10-CM

## 2021-05-18 PROCEDURE — 99214 OFFICE O/P EST MOD 30 MIN: CPT | Performed by: NURSE PRACTITIONER

## 2021-05-18 RX ORDER — LORATADINE 10 MG/1
10 TABLET ORAL DAILY
COMMUNITY
End: 2021-10-02

## 2021-05-18 NOTE — PROGRESS NOTES
Subjective   Get Segura is a 44year old male.     Patient presents with:  Wound Care: left plantar foot, left ,4th toe and right 4th toe    HPI   5/18/2021: Patient is now 44year old male with right foot deformity (L ankle fusion and a Varus deviation Rhythm: Normal rate and regular rhythm. Pulses: Normal pulses. Dorsalis pedis pulses are 2+ on the right side and 2+ on the left side. Heart sounds: Normal heart sounds.    Pulmonary:      Effort: Pulmonary effort is normal.      Breath Wound Depth (cm) 0 cm 05/18/21 1101   Wound Volume (cm^3) 0 cm^3 05/18/21 1101   Wound Bed Epithelium (%) 100 % 05/18/21 1101   Margins Well-defined edges 05/18/21 1101       Wound 05/18/21 3 Foot Left;Medial;Dorsal (Active)   Wound Image   05/18/21 1101 of the feet. Recommended urea 20% nightly manage callus formation and prevent ulceration. Palpable pulses indicating adequate circulation to heal these wounds. OM has been rolled out per patient by podiatry. No x-ray at [facility].   Recent labs: 4/6/202: A1c

## 2021-05-21 ENCOUNTER — NURSE ONLY (OUTPATIENT)
Dept: HEMATOLOGY/ONCOLOGY | Facility: HOSPITAL | Age: 51
End: 2021-05-21
Attending: INTERNAL MEDICINE
Payer: COMMERCIAL

## 2021-05-21 VITALS
DIASTOLIC BLOOD PRESSURE: 99 MMHG | BODY MASS INDEX: 28 KG/M2 | WEIGHT: 200 LBS | TEMPERATURE: 98 F | SYSTOLIC BLOOD PRESSURE: 152 MMHG | HEART RATE: 82 BPM | OXYGEN SATURATION: 98 % | RESPIRATION RATE: 16 BRPM | HEIGHT: 71 IN

## 2021-05-21 DIAGNOSIS — D72.820 LYMPHOCYTOSIS: Primary | ICD-10-CM

## 2021-05-21 DIAGNOSIS — M86.171 ACUTE OSTEOMYELITIS OF RIGHT FOOT (HCC): ICD-10-CM

## 2021-05-21 PROCEDURE — 36415 COLL VENOUS BLD VENIPUNCTURE: CPT

## 2021-05-21 PROCEDURE — 99244 OFF/OP CNSLTJ NEW/EST MOD 40: CPT | Performed by: INTERNAL MEDICINE

## 2021-05-21 NOTE — PROGRESS NOTES
Hematology Consultation Note    Patient Name: Cinthia Staples   YOB: 1970   Medical Record Number: [id number]   CSN: [id number]   Consulting Physician: Kelly Martinez MD  Referring Physician(s): Ally Krishna  Date of Consultation: 5/21/2021 Narrative      Mark Patel is  to Nikki x 2 yrs. He has no children. Patient works a [employer] . He and his wife live in [place], [place].          Allergies:     Dander                  UNKNOWN    Comment:Dog, cat  Latex (200 lb)   SpO2 98%   BMI 27.89 kg/m²     Physical Examination:    General: Patient is alert and oriented x 3, not in acute distress. Psych: Friendly, cooperative with appropriate questions and responses  HEENT: EOMs intact. Oropharynx is clear.    Neck: (AUTOMATED)    44year old M with PMH relevant for spina bifida parents with several foot related surgical procedures presents for evaluation of lymphocytosis     Plan:    1.) Lymphocytosis    --I discussed with Vinnie the absolute lymphocyte count greater t

## 2021-05-24 ENCOUNTER — TELEPHONE (OUTPATIENT)
Dept: HEMATOLOGY/ONCOLOGY | Facility: HOSPITAL | Age: 51
End: 2021-05-24

## 2021-05-24 ENCOUNTER — OFFICE VISIT (OUTPATIENT)
Dept: WOUND CARE | Facility: HOSPITAL | Age: 51
End: 2021-05-24
Attending: NURSE PRACTITIONER
Payer: COMMERCIAL

## 2021-05-24 DIAGNOSIS — L84 CALLUS OF FOOT: ICD-10-CM

## 2021-05-24 DIAGNOSIS — L97.511 RIGHT FOOT ULCER, LIMITED TO BREAKDOWN OF SKIN (HCC): ICD-10-CM

## 2021-05-24 DIAGNOSIS — L97.522 ULCER OF LEFT FOOT, WITH FAT LAYER EXPOSED (HCC): Primary | ICD-10-CM

## 2021-05-24 PROCEDURE — 97162 PT EVAL MOD COMPLEX 30 MIN: CPT

## 2021-05-24 NOTE — PROGRESS NOTES
Subjective   Pito Anselmo is a 44year old male. Chief complaint: Non healing wounds on both feet    HPI  5/18/2021: Patient is now 44year old male with right foot deformity (L ankle fusion and a Varus deviation of his foot) related to spina bifida. rhythm. Pulses: Normal pulses. Dorsalis pedis pulses are 2+ on the right side and 2+ on the left side. Heart sounds: Normal heart sounds. Pulmonary:      Effort: Pulmonary effort is normal.      Breath sounds: Normal breath sounds. Well-defined edges 05/24/21 1159   Non-staged Wound Description Partial thickness 05/24/21 1159       Wound 05/18/21 2 Toe (Comment which one) Dorsal;Left; Other (Comment) (Active)   Wound Image   05/24/21 1203   Site Assessment Clean;Dry; Intact 05/24/21 12 Dorsal;Right (Active)   Wound Image   05/24/21 1207   Site Assessment Pink;Pale;Painful 05/24/21 1207   Drainage Amount Small 05/24/21 1207   Drainage Description Clear;Serosanguineous 05/24/21 1207   Treatments Cleansed;Site Care 05/24/21 1207   Dressing required. Written PT Goals - . Short Term (4-6 weeks)    Reduce wound area by 50% - ongoing    Reduce necrosis by 100% - ongoing     Decrease depth by 75% - ongoing    Written PT Goals - Long Term (8-12 weeks)    Reduce wound area by 100% -ongoing    Wo

## 2021-05-24 NOTE — TELEPHONE ENCOUNTER
Called the patient regarding his lab results. 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  I left a message for the patient and informed him I could follow-up with him by phone next week or he can call the

## 2021-05-25 ENCOUNTER — TELEPHONE (OUTPATIENT)
Dept: HEMATOLOGY/ONCOLOGY | Facility: HOSPITAL | Age: 51
End: 2021-05-25

## 2021-05-25 DIAGNOSIS — D72.820 MONOCLONAL B-CELL LYMPHOCYTOSIS: Primary | ICD-10-CM

## 2021-05-25 NOTE — TELEPHONE ENCOUNTER
Called patient to review recent labs.   Reviewed the following per Dr. Elizabeth Baptiste note:    --The clonal population is identified does not meet criteria for CLL and is known as monoclonal B-cell lymphocytosis which might develop into CLL the future     --Monoc

## 2021-05-25 NOTE — TELEPHONE ENCOUNTER
Devorah Medina calling asking about lab results said one of dr garcia associates would talk to him.  Thank you brittany

## 2021-06-02 ENCOUNTER — OFFICE VISIT (OUTPATIENT)
Dept: WOUND CARE | Facility: HOSPITAL | Age: 51
End: 2021-06-02
Attending: NURSE PRACTITIONER
Payer: COMMERCIAL

## 2021-06-02 VITALS
OXYGEN SATURATION: 95 % | DIASTOLIC BLOOD PRESSURE: 93 MMHG | SYSTOLIC BLOOD PRESSURE: 145 MMHG | HEART RATE: 82 BPM | RESPIRATION RATE: 18 BRPM | TEMPERATURE: 98 F

## 2021-06-02 DIAGNOSIS — L84 CALLUS OF FOOT: ICD-10-CM

## 2021-06-02 DIAGNOSIS — L97.522 ULCER OF LEFT FOOT, WITH FAT LAYER EXPOSED (HCC): Primary | ICD-10-CM

## 2021-06-02 PROCEDURE — 99212 OFFICE O/P EST SF 10 MIN: CPT

## 2021-06-02 NOTE — PROGRESS NOTES
Subjective   Luh Chaudhry is a 44year old male. Pt denies any new issues in the last week.   Has a few areas on his feet that are reddened \"due to his shoes\"    Patient presents with:  Wound Care: B feet ulcers    HPI  5/18/2021: Patient is now 44 year Assessment Intact;Dry 06/02/21 1435   Drainage Amount None 06/02/21 1435   Treatments Site Care;Cleansed 05/24/21 1203   Dressing Vaseline gauze 05/24/21 1203   Dressing Changed New 05/24/21 1203   Dressing Status Dry;Clean; Intact 05/24/21 1203   Cris-woun Clear;Serosanguineous 05/24/21 1207   Treatments Cleansed;Site Care 05/24/21 1207   Dressing Vaseline gauze 05/24/21 1207   Dressing Changed Changed 05/24/21 1207   Dressing Status Clean;Dry; Intact 05/24/21 1207   Cris-wound Assessment Intact 06/02/21 4627 fusion and callus in this area. Of note, pt's L foot was cool during session, while the R foot was warm. Assessed perfusion, which was brisk via handheld doppler, so no concern in bloodflow was noted.       Encounter Diagnosis  Ulcer of left foot, with

## 2021-06-08 ENCOUNTER — TELEPHONE (OUTPATIENT)
Dept: WOUND CARE | Facility: HOSPITAL | Age: 51
End: 2021-06-08

## 2021-06-08 ENCOUNTER — OFFICE VISIT (OUTPATIENT)
Dept: WOUND CARE | Facility: HOSPITAL | Age: 51
End: 2021-06-08
Attending: NURSE PRACTITIONER
Payer: COMMERCIAL

## 2021-06-08 VITALS
RESPIRATION RATE: 18 BRPM | OXYGEN SATURATION: 97 % | HEART RATE: 78 BPM | DIASTOLIC BLOOD PRESSURE: 85 MMHG | TEMPERATURE: 99 F | SYSTOLIC BLOOD PRESSURE: 143 MMHG

## 2021-06-08 DIAGNOSIS — L97.522 ULCER OF LEFT FOOT, WITH FAT LAYER EXPOSED (HCC): ICD-10-CM

## 2021-06-08 PROCEDURE — 99213 OFFICE O/P EST LOW 20 MIN: CPT

## 2021-06-08 NOTE — PROGRESS NOTES
Subjective   Sarah Sylvester is a 44year old male. \"I feel good I have been working a lot and the wounds are all looking goog the only one left is the L bottom foot\".      HPI  5/18/2021: Patient is now 44year old male with right foot deformity (L ankl Care;Cleansed 06/08/21 1600   Dressing Vaseline gauze 06/08/21 1600   Dressing Changed New 06/08/21 1600   Dressing Status Dry;Clean; Intact 06/08/21 1600   Cris-wound Assessment Clean; Intact 06/08/21 1600   Wound Granulation Tissue Pink 05/24/21 1203   Wou Vaseline gauze 06/08/21 1603   Dressing Changed Changed 06/08/21 1603   Dressing Status Clean;Dry; Intact 06/08/21 1603   Cris-wound Assessment Intact 06/02/21 1436   Wound Granulation Tissue Thoreau 05/24/21 1207   Wound Odor None 05/18/21 1101   Wound Length inserts next month. Follow-Up  No follow-ups on file.

## 2021-06-09 ENCOUNTER — TELEPHONE (OUTPATIENT)
Dept: FAMILY MEDICINE CLINIC | Facility: CLINIC | Age: 51
End: 2021-06-09

## 2021-06-09 DIAGNOSIS — L97.522 ULCER OF LEFT FOOT, WITH FAT LAYER EXPOSED (HCC): Primary | ICD-10-CM

## 2021-06-09 NOTE — TELEPHONE ENCOUNTER
----- Message from Alejandra Ladd MD sent at 6/9/2021  9:09 AM CDT -----  Regarding: RE: Subject: additional wound care visits requested  Okay to place  Dr. Paramjit Danielle  ----- Message -----  From: Bryanna Carcamo PT, DPT [address] [place] [address],[address]  Sent: 6/8/2021   1:23 PM CDT  To: Figueroa Kuhn

## 2021-07-09 DIAGNOSIS — I10 ESSENTIAL HYPERTENSION: Primary | ICD-10-CM

## 2021-07-09 RX ORDER — LOSARTAN POTASSIUM 50 MG/1
TABLET ORAL
Qty: 90 TABLET | Refills: 1 | Status: SHIPPED | OUTPATIENT
Start: 2021-07-09 | End: 2022-06-03

## 2021-08-19 ENCOUNTER — APPOINTMENT (OUTPATIENT)
Dept: HEMATOLOGY/ONCOLOGY | Facility: HOSPITAL | Age: 51
End: 2021-08-19
Attending: INTERNAL MEDICINE
Payer: COMMERCIAL

## 2021-08-25 ENCOUNTER — TELEPHONE (OUTPATIENT)
Dept: HEMATOLOGY/ONCOLOGY | Facility: HOSPITAL | Age: 51
End: 2021-08-25

## 2021-08-25 NOTE — TELEPHONE ENCOUNTER
Called pt unable to reach LM on VM that he has appointment tomorrow at 4pm and that we would like patient to have CBC drawn before his appointment.   Number provided for call back if he has any questions

## 2021-08-26 ENCOUNTER — NURSE ONLY (OUTPATIENT)
Dept: HEMATOLOGY/ONCOLOGY | Facility: HOSPITAL | Age: 51
End: 2021-08-26
Attending: INTERNAL MEDICINE
Payer: COMMERCIAL

## 2021-08-26 VITALS
HEART RATE: 74 BPM | RESPIRATION RATE: 16 BRPM | TEMPERATURE: 98 F | SYSTOLIC BLOOD PRESSURE: 145 MMHG | DIASTOLIC BLOOD PRESSURE: 88 MMHG | OXYGEN SATURATION: 96 %

## 2021-08-26 DIAGNOSIS — D72.820 MONOCLONAL B-CELL LYMPHOCYTOSIS: ICD-10-CM

## 2021-08-26 DIAGNOSIS — D72.820 MONOCLONAL B-CELL LYMPHOCYTOSIS: Primary | ICD-10-CM

## 2021-08-26 LAB
BASOPHILS # BLD AUTO: 0.05 X10(3) UL (ref 0–0.2)
BASOPHILS NFR BLD AUTO: 0.4 %
DEPRECATED RDW RBC AUTO: 42.2 FL (ref 35.1–46.3)
EOSINOPHIL # BLD AUTO: 0.22 X10(3) UL (ref 0–0.7)
EOSINOPHIL NFR BLD AUTO: 2 %
ERYTHROCYTE [DISTWIDTH] IN BLOOD BY AUTOMATED COUNT: 12.2 % (ref 11–15)
HCT VFR BLD AUTO: 44.7 %
HGB BLD-MCNC: 15.3 G/DL
IMM GRANULOCYTES # BLD AUTO: 0.01 X10(3) UL (ref 0–1)
IMM GRANULOCYTES NFR BLD: 0.1 %
LYMPHOCYTES # BLD AUTO: 7.17 X10(3) UL (ref 1–4)
LYMPHOCYTES NFR BLD AUTO: 64.2 %
MCH RBC QN AUTO: 31.9 PG (ref 26–34)
MCHC RBC AUTO-ENTMCNC: 34.2 G/DL (ref 31–37)
MCV RBC AUTO: 93.1 FL
MONOCYTES # BLD AUTO: 0.56 X10(3) UL (ref 0.1–1)
MONOCYTES NFR BLD AUTO: 5 %
NEUTROPHILS # BLD AUTO: 3.16 X10 (3) UL (ref 1.5–7.7)
NEUTROPHILS # BLD AUTO: 3.16 X10(3) UL (ref 1.5–7.7)
NEUTROPHILS NFR BLD AUTO: 28.3 %
PLATELET # BLD AUTO: 148 10(3)UL (ref 150–450)
RBC # BLD AUTO: 4.8 X10(6)UL
WBC # BLD AUTO: 11.2 X10(3) UL (ref 4–11)

## 2021-08-26 PROCEDURE — 36415 COLL VENOUS BLD VENIPUNCTURE: CPT

## 2021-08-26 PROCEDURE — 85025 COMPLETE CBC W/AUTO DIFF WBC: CPT

## 2021-08-26 PROCEDURE — 99214 OFFICE O/P EST MOD 30 MIN: CPT | Performed by: INTERNAL MEDICINE

## 2021-08-26 NOTE — PROGRESS NOTES
Hematology Progress Note    Patient Name: Luh Chaudhry   YOB: 1970   Medical Record Number: [id number]   CSN: [id number]   Consulting Physician: Colleen Salinas MD  Referring Physician(s): Carmen Roberts  Date of Visit: 8/26/2021     Chief Com surgeries in feet to adjust for spina bifida       Family Medical History:  Family History   Problem Relation Age of Onset   • Heart Disorder Mother    • DVT/VTE Mother 79   • Prostate Cancer Father 76   • Cancer Paternal Uncle 61        tobacco related; l speech problems, gait problems and weakness. A comprehensive 14 point review of systems was completed. Pertinent positives and negatives noted in the the HPI.      Vital Signs:  /88 (BP Location: Left arm, Patient Position: Sitting, Cuff Size: abigail lymphocytosis  (primary encounter diagnosis)  Plan: CBC WITH DIFFERENTIAL WITH PLATELET, LEUKEMIA         LYMPHOMA FLOW BLOOD    46year old M with PMH relevant for spina bifida parents with several foot related surgical procedures presents for evaluation

## 2021-09-28 ENCOUNTER — HOSPITAL ENCOUNTER (INPATIENT)
Facility: HOSPITAL | Age: 51
LOS: 4 days | Discharge: HOME | DRG: 593 | End: 2021-10-02
Attending: EMERGENCY MEDICINE | Admitting: HOSPITALIST
Payer: COMMERCIAL

## 2021-09-28 ENCOUNTER — APPOINTMENT (OUTPATIENT)
Dept: GENERAL RADIOLOGY | Facility: HOSPITAL | Age: 51
DRG: 593 | End: 2021-09-28
Attending: EMERGENCY MEDICINE
Payer: COMMERCIAL

## 2021-09-28 ENCOUNTER — HOSPITAL ENCOUNTER (OUTPATIENT)
Age: 51
Discharge: ANOTHER HOSPITAL | End: 2021-09-28
Payer: COMMERCIAL

## 2021-09-28 VITALS
HEART RATE: 88 BPM | OXYGEN SATURATION: 99 % | TEMPERATURE: 99 F | RESPIRATION RATE: 18 BRPM | DIASTOLIC BLOOD PRESSURE: 88 MMHG | SYSTOLIC BLOOD PRESSURE: 150 MMHG

## 2021-09-28 DIAGNOSIS — L97.529 ULCER OF LEFT FOOT, UNSPECIFIED ULCER STAGE (HCC): Primary | ICD-10-CM

## 2021-09-28 DIAGNOSIS — L03.116 LEFT LEG CELLULITIS: ICD-10-CM

## 2021-09-28 DIAGNOSIS — L03.116 CELLULITIS OF LEFT LOWER EXTREMITY: Primary | ICD-10-CM

## 2021-09-28 PROCEDURE — 73630 X-RAY EXAM OF FOOT: CPT | Performed by: EMERGENCY MEDICINE

## 2021-09-28 PROCEDURE — 99222 1ST HOSP IP/OBS MODERATE 55: CPT | Performed by: HOSPITALIST

## 2021-09-28 PROCEDURE — 99215 OFFICE O/P EST HI 40 MIN: CPT | Performed by: NURSE PRACTITIONER

## 2021-09-28 RX ORDER — ONDANSETRON 2 MG/ML
4 INJECTION INTRAMUSCULAR; INTRAVENOUS EVERY 6 HOURS PRN
Status: DISCONTINUED | OUTPATIENT
Start: 2021-09-28 | End: 2021-10-02

## 2021-09-28 RX ORDER — TEMAZEPAM 15 MG/1
15 CAPSULE ORAL NIGHTLY PRN
Status: DISCONTINUED | OUTPATIENT
Start: 2021-09-28 | End: 2021-10-02

## 2021-09-28 RX ORDER — VANCOMYCIN 2 GRAM/500 ML IN 0.9 % SODIUM CHLORIDE INTRAVENOUS
25 ONCE
Status: COMPLETED | OUTPATIENT
Start: 2021-09-28 | End: 2021-09-28

## 2021-09-28 RX ORDER — VANCOMYCIN HYDROCHLORIDE
15 EVERY 12 HOURS
Status: DISCONTINUED | OUTPATIENT
Start: 2021-09-29 | End: 2021-09-29

## 2021-09-28 RX ORDER — HYDROCODONE BITARTRATE AND ACETAMINOPHEN 5; 325 MG/1; MG/1
1 TABLET ORAL EVERY 4 HOURS PRN
Status: DISCONTINUED | OUTPATIENT
Start: 2021-09-28 | End: 2021-10-02

## 2021-09-28 RX ORDER — PROCHLORPERAZINE EDISYLATE 5 MG/ML
5 INJECTION INTRAMUSCULAR; INTRAVENOUS EVERY 8 HOURS PRN
Status: DISCONTINUED | OUTPATIENT
Start: 2021-09-28 | End: 2021-10-02

## 2021-09-28 RX ORDER — SODIUM CHLORIDE 9 MG/ML
INJECTION, SOLUTION INTRAVENOUS CONTINUOUS
Status: DISCONTINUED | OUTPATIENT
Start: 2021-09-28 | End: 2021-09-30

## 2021-09-28 RX ORDER — HEPARIN SODIUM 5000 [USP'U]/ML
5000 INJECTION, SOLUTION INTRAVENOUS; SUBCUTANEOUS EVERY 12 HOURS SCHEDULED
Status: DISCONTINUED | OUTPATIENT
Start: 2021-09-28 | End: 2021-10-02

## 2021-09-28 RX ORDER — ACETAMINOPHEN 325 MG/1
650 TABLET ORAL EVERY 4 HOURS PRN
Status: DISCONTINUED | OUTPATIENT
Start: 2021-09-28 | End: 2021-10-02

## 2021-09-28 RX ORDER — HYDROCODONE BITARTRATE AND ACETAMINOPHEN 5; 325 MG/1; MG/1
2 TABLET ORAL EVERY 4 HOURS PRN
Status: DISCONTINUED | OUTPATIENT
Start: 2021-09-28 | End: 2021-10-02

## 2021-09-28 RX ORDER — ACETAMINOPHEN 325 MG/1
650 TABLET ORAL EVERY 6 HOURS PRN
Status: DISCONTINUED | OUTPATIENT
Start: 2021-09-28 | End: 2021-09-28

## 2021-09-28 NOTE — ED INITIAL ASSESSMENT (HPI)
Pt states having hx of foot ulcer and states has a hx of recurring infection. Pt states Sunday began having pain and noticing red streaking. Pt states having a low grade fever yesterday.

## 2021-09-28 NOTE — ED PROVIDER NOTES
Patient Seen in: [facility] AND [facility] Emergency Department      History   Patient presents with:  Cellulitis    Stated Complaint: [place], sent for foot ulcer    Subjective:   HPI    23-year-old male with history of hypertension, spina bifida, previous systems are as noted in HPI. Constitutional and vital signs reviewed. All other systems reviewed and negative except as noted above.     Physical Exam     ED Triage Vitals [09/28/21 1237]   /79   Pulse 102   Resp 16   Temp 98 °F (36.7 °C)   Temp GFR, -American 79 >=60   Lactic Acid, Plasma    Collection Time: 09/28/21  1:20 PM   Result Value Ref Range    Lactic Acid 1.8 0.4 - 2.0 mmol/L   CBC W/ DIFFERENTIAL    Collection Time: 09/28/21  1:20 PM   Result Value Ref Range    WBC 12.5 (H) 4.0 available prior medical records for any recent pertinent discharge summaries, testing, and procedures, and reviewed those reports. Complicating Factors: The patient already has does not have any pertinent problems on file.  to contribute to the complexit

## 2021-09-28 NOTE — ED QUICK NOTES
Orders for admission, patient is aware of plan and ready to go upstairs. Any questions, please call ED RN Bishop Hester   at extension [phone].    Type of COVID test sent: Rapid negative- pt vaccinated  COVID Suspicion level: Low    Titratable drug(s) infusing:  Ra

## 2021-09-28 NOTE — PROGRESS NOTES
[facility] Dosing Service    Initial Pharmacokinetic Consult for Vancomycin AUC Dosing    Wilfrido Martinez is a 46year old patient who is being treated for LLE cellulitis. Pharmacy has been asked to dose vancomycin by Dr. Bakari Borges.     Weights:  Ideal body

## 2021-09-28 NOTE — H&P
[facility]    PATIENT'S NAME: Kita Disla   ATTENDING PHYSICIAN: Clary Barba MD   PATIENT ACCOUNT#:   [id number]    LOCATION:  [facility] [address] RECORD #:   [id number]       YOB: 1970  ADMISSION DATE:       09/28/2021 daily living. REVIEW OF SYSTEMS:  Patient has a chronic callus at the plantar aspect of his left foot club deformity.   Recently he had chronic very minute drainage from that callus, but since yesterday started developing erythema of his left foot with

## 2021-09-28 NOTE — ED PROVIDER NOTES
Patient Seen in: [facility]      History   Patient presents with:  Leg Pain    Stated Complaint: left leg infection    Subjective:   Well-appearing 68-year-old male with a history of left foot deformity due to spina bifida and multiple surge Oral   SpO2 99 %   O2 Device None (Room air)       Current:/88   Pulse 88   Temp 98.5 °F (36.9 °C) (Oral)   Resp 18   SpO2 99%         Physical Exam  VS: Vital signs reviewed. 02 saturation within normal limits for this patient.     General: Patient i go to ER after leaving IC, patient agrees with plan of care. Patient communicates that he will be driving himself to [place] ER. Case was discussed with Dr. Horace Martinez by phone who agrees with plan of care and disposition.     Disposition and Plan     C

## 2021-09-28 NOTE — ED QUICK NOTES
Pt to ED with c/o left leg redness that is streaking up his thigh since yesterday. Pt states currently being treated for left planter foot ulcer. Pt states fever of 101 yesterday. No drainage noted from left foot ulcer. Pt denies hx of diabetes.  Hx of mult

## 2021-09-28 NOTE — ED INITIAL ASSESSMENT (HPI)
PT sent over from [facility] for infection to his left leg. Reports he has had an ulcer to his left foot and noted increasing redness to his leg on Thursday. Now redness is streaking up his leg. Afebrile.  RR even and nonlabored, speaking in full sentences, ambula

## 2021-09-29 ENCOUNTER — APPOINTMENT (OUTPATIENT)
Dept: MRI IMAGING | Facility: HOSPITAL | Age: 51
DRG: 593 | End: 2021-09-29
Attending: PODIATRIST
Payer: COMMERCIAL

## 2021-09-29 PROCEDURE — 73720 MRI LWR EXTREMITY W/O&W/DYE: CPT | Performed by: PODIATRIST

## 2021-09-29 PROCEDURE — 73721 MRI JNT OF LWR EXTRE W/O DYE: CPT | Performed by: PODIATRIST

## 2021-09-29 PROCEDURE — 73723 MRI JOINT LWR EXTR W/O&W/DYE: CPT | Performed by: PODIATRIST

## 2021-09-29 PROCEDURE — 73718 MRI LOWER EXTREMITY W/O DYE: CPT | Performed by: PODIATRIST

## 2021-09-29 PROCEDURE — 0HBNXZZ EXCISION OF LEFT FOOT SKIN, EXTERNAL APPROACH: ICD-10-PCS | Performed by: PODIATRIST

## 2021-09-29 PROCEDURE — 99233 SBSQ HOSP IP/OBS HIGH 50: CPT | Performed by: HOSPITALIST

## 2021-09-29 RX ORDER — POTASSIUM CHLORIDE 20 MEQ/1
40 TABLET, EXTENDED RELEASE ORAL ONCE
Status: COMPLETED | OUTPATIENT
Start: 2021-09-29 | End: 2021-09-29

## 2021-09-29 RX ORDER — CEFAZOLIN SODIUM/WATER 2 G/20 ML
2 SYRINGE (ML) INTRAVENOUS EVERY 8 HOURS
Status: DISCONTINUED | OUTPATIENT
Start: 2021-09-29 | End: 2021-10-02

## 2021-09-29 NOTE — PROGRESS NOTES
[facility] HOSP - [facility]    Progress Note    Naoma Major Patient Status:  Inpatient    8/10/1970 MRN [id number]   Location [facility]5W Attending Maya Martínez MD   Hosp Day # 1 PCP Cheri Lopes MD       Subjective:   Naoma Major is temazepam (RESTORIL) cap 15 mg, 15 mg, Oral, Nightly PRN    Assessment and Plan:     Cellulitis of left lower extremity  In the setting of existing clubfoot deformity  ID and podiatry consulted  With L charcot foot  Initially started on vanc, now on cefazo

## 2021-09-29 NOTE — PLAN OF CARE
Patient is A & Ox's 4. Replaced potassium today. Patient has safety precautions in place the bed in the lowest position, bed alarm on, and call light within reach. Continue to monitor patient with frequent nursing rounds.    Problem: Patient Centered Care needed  Outcome: Progressing  Goal: Incision(s), wounds(s) or drain site(s) healing without S/S of infection  Description: INTERVENTIONS:  - Assess and document risk factors for pressure ulcer development  - Assess and document skin integrity  - Assess and facility with appropriate resources  Description: INTERVENTIONS:  - Identify barriers to discharge w/pt and caregiver  - Include patient/family/discharge partner in discharge planning  - Arrange for needed discharge resources and transportation as appropri

## 2021-09-29 NOTE — CONSULTS
[facility] HOSP - [facility]    Report of Consultation    Naoma Major Patient Status:  Inpatient    8/10/1970 MRN [id number]   Location [facility]5W Attending Scott Minaya MD   Hosp Day # 1 PCP Cheri Lopes MD     Date of Admission:   to Daniela x 2 yrs. He has no children. Patient works a [employer] . He and his wife live in [place], [place].              Current Medications:  0.9% NaCl infusion, , Intravenous, Continuous  Heparin Sodium (Porcine) 5000 UNIT/ML injection 5, seconds to digits 1, 2, 3, 4, 5 right side and 4, 5 left side. No tenderness appreciated in bilateral legs at time of encounter. NEUROLOGICAL: Sensation is grossly diminished to the level of the midfoot, bilateral lower extremities.  Reflexes are intact Finalized by (CST): Tiffanie Jean MD on 9/28/2021 at 1:57 PM                Impression:     Cellulitis of left lower extremity      Recommendations:  Patient seen bedside.   Patient resting comfortably, relating feeling much better than yesterday

## 2021-09-29 NOTE — CONSULTS
[facility] ID CONSULT NOTE    Gilma Jimenez Patient Status:  Inpatient    8/10/1970 MRN [id number]   Location [facility]5W Attending Marguerite Ward MD   Hosp Day # 1 PCP Ceci Keenan MD       Reason for Consultat Comment:Dog, cat  Latex                   RASH  Mixed Feathers          UNKNOWN    Comment:Down feathers  Ragweed                 UNKNOWN  Shellfish-Derived P*        Medications:    Current Facility-Administered Medications:   •  ceFAZolin sodium (ANCEF/K auscultation bilaterally. No wheezes. No rhonchi. Abdomen: Soft, nontender, nondistended. Musculoskeletal: L foot wrapped with bandage  Integument: L leg with erythema and warmth spreading up to mid thigh level.      Laboratory Data:  Recent Labs   Lab

## 2021-09-29 NOTE — CDS QUERY
Clarification of Procedure - Debridement (ALL)      CLINICAL DOCUMENTATION CLARIFICATION FORM  How To Answer This Query:  1)  Click \"Edit Button\" on the toolbar. 2)  Type an \"X\" in the bracket for the diagnosis that applies.   (You may also add additio is a 57-year-old  male with history of spina bifida and chronic left clubfoot deformity. He came in today to the emergency department for evaluation of left foot erythema and streaking upwards towards his left thigh.   CBC showed white blood cell

## 2021-09-29 NOTE — PLAN OF CARE
Problem: Patient Centered Care  Goal: Patient preferences are identified and integrated in the patient's plan of care  Description: Interventions:  - What would you like us to know as we care for you?  \"From home with wife\"  - Provide timely, complete, skin integrity  - Assess and document dressing/incision, wound bed, drain sites and surrounding tissue  - Implement wound care per orders  - Initiate isolation precautions as appropriate  - Initiate Pressure Ulcer prevention bundle as indicated  Outcome: P transportation as appropriate  - Identify discharge learning needs (meds, wound care, etc)  - Arrange for interpreters to assist at discharge as needed  - Consider post-discharge preferences of patient/family/discharge partner  - Complete POLST form as stef

## 2021-09-29 NOTE — CM/SW NOTE
Per nursing rounds, patient is home w/wife. Self PTA, works in warehouse. Cellulitis LLE    MRI to r/o osteomyelitis    ID consult ordered. CM to monitor for outpt IV abx    PLAN; TBD    CM/SW to remain available for support and/or discharge planning.

## 2021-09-30 ENCOUNTER — APPOINTMENT (OUTPATIENT)
Dept: ULTRASOUND IMAGING | Facility: HOSPITAL | Age: 51
DRG: 593 | End: 2021-09-30
Attending: PODIATRIST
Payer: COMMERCIAL

## 2021-09-30 PROCEDURE — 99233 SBSQ HOSP IP/OBS HIGH 50: CPT | Performed by: HOSPITALIST

## 2021-09-30 PROCEDURE — 93971 EXTREMITY STUDY: CPT | Performed by: PODIATRIST

## 2021-09-30 NOTE — PROGRESS NOTES
INFECTIOUS DISEASE PROGRESS NOTE  [facility] ID PROGRESS NOTE    Hannah Landrafael Patient Status:  Inpatient    8/10/1970 MRN [id number]   Location [facility]5W Attending Santy Loredo MD   Hosp Day # 2 PCP Wyn Holter Follow fever curve, wbc. Repeat CBC/diff tomorrow AM.  -  Reviewed labs, micro, imaging reports.  -  Case d/w patient, RN.     Yuan Rascon PA-C   [facility]  [phone]  9/30/2021

## 2021-09-30 NOTE — PROGRESS NOTES
[facility]    Report of Consultation    Farida Guido Patient Status:  Inpatient    8/10/1970 MRN [id number]   Location [facility]5W Attending Karlos Lemons MD   Hosp Day # 2 PCP Ingris Brown MD     Date of Admission:   UNIT/ML injection 5,000 Units, 5,000 Units, Subcutaneous, 2 times per day  acetaminophen (TYLENOL) tab 650 mg, 650 mg, Oral, Q4H PRN   Or  HYDROcodone-acetaminophen (NORCO) 5-325 MG per tab 1 tablet, 1 tablet, Oral, Q4H PRN   Or  HYDROcodone-acetaminophen Pain to medial ankle and along course of ascending erythema, mild. There are deformities present. Absence of toes 1, 2, 3 on left foot with midfoot arch collapse left.  Contractures present to digits 2, 3, 4, 5 right foot - semi rigid; and contractures plan skin thickening, consistent with edema or cellulitis. No associated subcutaneous fluid collection  to suggest abscess. 3. Extensive chronic postoperative changes from a remote ankle fusion and subsequent partial amputations of the 1st-3rd toes.     Dictate

## 2021-09-30 NOTE — PLAN OF CARE
Patient is A & Ox's 4. Patient will have an ultrasound doppler done for left leg/foot. Patient has safety precautions in place the bed in the lowest position, bed alarm on, and call light within reach.  Continue to monitor patient with frequent nursing Raven Alston algorithm/standards of care as needed  Outcome: Progressing  Goal: Incision(s), wounds(s) or drain site(s) healing without S/S of infection  Description: INTERVENTIONS:  - Assess and document risk factors for pressure ulcer development  - Assess and docume Discharge to home or other facility with appropriate resources  Description: INTERVENTIONS:  - Identify barriers to discharge w/pt and caregiver  - Include patient/family/discharge partner in discharge planning  - Arrange for needed discharge resources and

## 2021-09-30 NOTE — PLAN OF CARE
Problem: Patient Centered Care  Goal: Patient preferences are identified and integrated in the patient's plan of care  Description: Interventions:  - What would you like us to know as we care for you?  \"From home with wife\"  - Provide timely, complete, pain using appropriate pain scale  - Administer analgesics based on type and severity of pain and evaluate response  - Implement non-pharmacological measures as appropriate and evaluate response  - Consider cultural and social influences on pain and pain m physician/LIP order or complex needs related to functional status, cognitive ability or social support system  Outcome: Progressing   Low grade fever noted, denies pain, tylenol given, iv ancef given, no acute changes noted, patient calling appropriately,

## 2021-09-30 NOTE — PROGRESS NOTES
[facility]    Progress Note    Benson Mcclellan Patient Status:  Inpatient    8/10/1970 MRN [id number]   Location [facility]5W Attending Khadijah Friend MD   Hosp Day # 2 PCP Pascale Carbajal MD       Subjective:   Benson Mcclellan is Intravenous, Q8H PRN  •  temazepam (RESTORIL) cap 15 mg, 15 mg, Oral, Nightly PRN    Assessment and Plan:     Cellulitis of left lower extremity  In the setting of existing clubfoot deformity  L plantar ulceration, stable  ID and podiatry consulted  With L cellulitis. No associated subcutaneous fluid collection  to suggest abscess. 3. Extensive chronic postoperative changes from a remote ankle fusion and subsequent partial amputations of the 1st-3rd toes.     Dictated by (CST): Kenneth Mcgill MD on 9/29/

## 2021-10-01 PROCEDURE — 99233 SBSQ HOSP IP/OBS HIGH 50: CPT | Performed by: HOSPITALIST

## 2021-10-01 NOTE — PLAN OF CARE
No acute changes throughout the night. Denied pain. Continues on IV ancef. Left foot dressing changed, scant sanguineous drainage noted. Safety precautions in place.      Problem: Patient Centered Care  Goal: Patient preferences are identified and integrate document skin integrity  - Assess and document dressing/incision, wound bed, drain sites and surrounding tissue  - Implement wound care per orders  - Initiate isolation precautions as appropriate  - Initiate Pressure Ulcer prevention bundle as indicated  O and transportation as appropriate  - Identify discharge learning needs (meds, wound care, etc)  - Arrange for interpreters to assist at discharge as needed  - Consider post-discharge preferences of patient/family/discharge partner  - Complete POLST form as

## 2021-10-01 NOTE — CM/SW NOTE
Per nursing rounds, pt MRI neg for osteomyelitis. Per RN,  Dc plan:  pt will be switched to PO antibiotics tomorrow and possible D/C tomorrow    CM/SW to remain available for support and/or discharge planning.     Екатерина Coulter RN, [facility]    Ext

## 2021-10-01 NOTE — PROGRESS NOTES
INFECTIOUS DISEASE PROGRESS NOTE  [facility]ESS [facility]    Jorge L Barber Patient Status:  Inpatient    8/10/1970 MRN [id number]   Location [facility]5W Attending Hakeem Justice MD   Hosp Day # 3 PCP Maron Eisenmenger PO cefadroxil to complete two week course (EOT 10/11/21). -  Follow fever curve, wbc. -  Leg elevation.  -  Follow fever curve, wbc. -  Reviewed labs, micro, imaging reports.  -  Case d/w patient, RN.     Marcie Cowden, PA-C [facility]

## 2021-10-01 NOTE — PROGRESS NOTES
[facility] HOSP - [facility]    Progress Note    Roddy Hsuer Patient Status:  Inpatient    8/10/1970 MRN [id number]   Location [facility]5W Attending Nova Garzon MD   Hosp Day # 3 PCP Stephon Ramirez MD       Subjective:   Jasviremiliojames Cunningham is mg, 15 mg, Oral, Nightly PRN    Assessment and Plan:     Cellulitis of left lower extremity  In the setting of existing clubfoot deformity  L plantar ulceration, stable  ID and podiatry consulted  With L thongcot foot  Initially started on vanc, now on cefa edema or cellulitis. No associated subcutaneous fluid collection  to suggest abscess. 3. Extensive chronic postoperative changes from a remote ankle fusion and subsequent partial amputations of the 1st-3rd toes.     Dictated by (CST): Jan Campo MD

## 2021-10-02 VITALS
SYSTOLIC BLOOD PRESSURE: 121 MMHG | HEIGHT: 72 IN | RESPIRATION RATE: 16 BRPM | DIASTOLIC BLOOD PRESSURE: 67 MMHG | WEIGHT: 198 LBS | BODY MASS INDEX: 26.82 KG/M2 | OXYGEN SATURATION: 94 % | TEMPERATURE: 98 F | HEART RATE: 78 BPM

## 2021-10-02 PROCEDURE — 99239 HOSP IP/OBS DSCHRG MGMT >30: CPT | Performed by: HOSPITALIST

## 2021-10-02 RX ORDER — CEFADROXIL 500 MG/1
500 CAPSULE ORAL 2 TIMES DAILY
Qty: 20 CAPSULE | Refills: 0 | Status: SHIPPED | OUTPATIENT
Start: 2021-10-02 | End: 2021-10-02

## 2021-10-02 RX ORDER — CEFADROXIL 500 MG/1
1 CAPSULE ORAL 2 TIMES DAILY
Qty: 40 CAPSULE | Refills: 0 | Status: SHIPPED | OUTPATIENT
Start: 2021-10-02 | End: 2021-10-02

## 2021-10-02 RX ORDER — CEFADROXIL 500 MG/1
1 CAPSULE ORAL 2 TIMES DAILY
Qty: 40 CAPSULE | Refills: 0 | Status: SHIPPED | OUTPATIENT
Start: 2021-10-02 | End: 2021-10-12

## 2021-10-02 NOTE — PLAN OF CARE
Plan for discharge today on PO antibiotics per ID. Patient and wife informed of discharge instructions. RN DISCHARGE SUMMARY: Discharge order placed. IV removed. Understands to follow up with pcp in 1 week.  Patient aware to  cefadroxil with prin interventions, skin care algorithm/standards of care as needed  Outcome: Progressing  Goal: Incision(s), wounds(s) or drain site(s) healing without S/S of infection  Description: INTERVENTIONS:  - Assess and document risk factors for pressure ulcer develop DISCHARGE PLANNING  Goal: Discharge to home or other facility with appropriate resources  Description: INTERVENTIONS:  - Identify barriers to discharge w/pt and caregiver  - Include patient/family/discharge partner in discharge planning  - Arrange for need

## 2021-10-02 NOTE — PROGRESS NOTES
INFECTIOUS DISEASE PROGRESS NOTE  [facility]    Le Garcia Patient Status:  Inpatient    8/10/1970 MRN [id number]   Location [facility]5W Attending Ping Moe MD   Hosp Day # 4 PCP Tresa Henderson g bid x 10 days with close FU with Dr. Carmine Quezada in [facility] next week. -  Follow fever curve, wbc. -  Leg elevation.  -  Follow fever curve, wbc. -  Reviewed labs, micro, imaging reports.  -  Case d/w patient, RN.     Melina Godoy PA-C   [facility]

## 2021-10-02 NOTE — PLAN OF CARE
No acute changes throughout the night. Denied pain. Continues on IV ancef. Safety precautions in place. Plan for possible discharge home on oral antibiotics.      Problem: Patient Centered Care  Goal: Patient preferences are identified and integrated in the wound bed, drain sites and surrounding tissue  - Implement wound care per orders  - Initiate isolation precautions as appropriate  - Initiate Pressure Ulcer prevention bundle as indicated  Outcome: Progressing     Problem: PAIN - ADULT  Goal: Verbalizes/di needs (meds, wound care, etc)  - Arrange for interpreters to assist at discharge as needed  - Consider post-discharge preferences of patient/family/discharge partner  - Complete POLST form as appropriate  - Assess patient's ability to be responsible for ma

## 2021-10-02 NOTE — DISCHARGE SUMMARY
[facility]    Discharge Summary    Gearline César Patient Status:  Inpatient    8/10/1970 MRN [id number]   Location [facility]5W Attending Alejandra Gay MD   Hosp Day # 4 PCP Linus Cota MD     Date of Admission: 20 X-ray of the left foot showed no radiographic evidence of acute osteomyelitis. Patient was started on IV vancomycin. He will be admitted to the hospital for further management.       Hospital Course:   Cellulitis of left lower extremity  In the setting of Wesly Carcamo MD  In 1 week    [address] [place] [address]  [phone]       Greater than 35 minutes spent, >50% spent counseling re: treatment plan and workup    Lincoln Tracy.  Waldo Curling, MD  10/2/2021

## 2021-10-04 ENCOUNTER — PATIENT OUTREACH (OUTPATIENT)
Dept: CASE MANAGEMENT | Age: 51
End: 2021-10-04

## 2021-10-05 NOTE — PROGRESS NOTES
LM for pt to call [facility] for TCM since discharge. [facility] phone number was provided for pt to call back.

## 2021-11-29 ENCOUNTER — LAB ENCOUNTER (OUTPATIENT)
Dept: LAB | Facility: HOSPITAL | Age: 51
End: 2021-11-29
Attending: INTERNAL MEDICINE
Payer: COMMERCIAL

## 2021-11-29 DIAGNOSIS — D72.820 MONOCLONAL B-CELL LYMPHOCYTOSIS: ICD-10-CM

## 2021-11-29 PROCEDURE — 88184 FLOWCYTOMETRY/ TC 1 MARKER: CPT

## 2021-11-29 PROCEDURE — 88189 FLOWCYTOMETRY/READ 16 & >: CPT

## 2021-11-29 PROCEDURE — 85060 BLOOD SMEAR INTERPRETATION: CPT

## 2021-11-29 PROCEDURE — 88185 FLOWCYTOMETRY/TC ADD-ON: CPT

## 2021-11-29 PROCEDURE — 85025 COMPLETE CBC W/AUTO DIFF WBC: CPT

## 2021-11-29 PROCEDURE — 36415 COLL VENOUS BLD VENIPUNCTURE: CPT

## 2021-12-01 ENCOUNTER — OFFICE VISIT (OUTPATIENT)
Dept: HEMATOLOGY/ONCOLOGY | Facility: HOSPITAL | Age: 51
End: 2021-12-01
Attending: INTERNAL MEDICINE
Payer: COMMERCIAL

## 2021-12-01 VITALS
OXYGEN SATURATION: 97 % | HEART RATE: 69 BPM | DIASTOLIC BLOOD PRESSURE: 85 MMHG | TEMPERATURE: 98 F | BODY MASS INDEX: 28.7 KG/M2 | WEIGHT: 205 LBS | SYSTOLIC BLOOD PRESSURE: 153 MMHG | HEIGHT: 71 IN | RESPIRATION RATE: 18 BRPM

## 2021-12-01 DIAGNOSIS — D72.820 MONOCLONAL B-CELL LYMPHOCYTOSIS: Primary | ICD-10-CM

## 2021-12-01 DIAGNOSIS — D69.6 THROMBOCYTOPENIA (HCC): ICD-10-CM

## 2021-12-01 PROCEDURE — 99214 OFFICE O/P EST MOD 30 MIN: CPT | Performed by: INTERNAL MEDICINE

## 2021-12-01 NOTE — PROGRESS NOTES
Hematology Progress Note    Patient Name: Conrad Duane   YOB: 1970   Medical Record Number: [id number]   CSN: [id number]   Consulting Physician: Dennis Longo MD  Referring Physician(s): Alejandra Ladd  Date of Visit: 12/1/2021     Chief Com History   Problem Relation Age of Onset   • Heart Disorder Mother    • DVT/VTE Mother 79   • Prostate Cancer Father 76   • Cancer Paternal Uncle 61        tobacco related; likely    • Cancer Paternal Uncle 76        tobacco related; likely    • Cancer Weiner 14 point review of systems was completed. Pertinent positives and negatives noted in the the HPI.      Vital Signs:  /85 (BP Location: Left arm, Patient Position: Sitting, Cuff Size: large)   Pulse 69   Temp 98 °F (36.7 °C) (Oral)   Resp 18   Ht 1.80 lymphocytosis  (primary encounter diagnosis)  Plan: CBC WITH DIFFERENTIAL WITH PLATELET, COMP         METABOLIC PANEL (14), LEUKEMIA LYMPHOMA FLOW         BLOOD    (D69.6) Thrombocytopenia (HCC)  Plan: FOLIC ACID SERUM(FOLATE), VITAMIN B12, COMP         ME

## 2021-12-15 ENCOUNTER — TELEPHONE (OUTPATIENT)
Dept: FAMILY MEDICINE CLINIC | Facility: CLINIC | Age: 51
End: 2021-12-15

## 2021-12-15 VITALS — SYSTOLIC BLOOD PRESSURE: 138 MMHG | DIASTOLIC BLOOD PRESSURE: 88 MMHG

## 2021-12-15 NOTE — TELEPHONE ENCOUNTER
Pt calling back. He states his BP at home has been WNL.   They usually trend bettween:    135-138/85-88

## 2021-12-15 NOTE — TELEPHONE ENCOUNTER
Left message for the pt to call back with home BP reading    If no home reading please have the pt come for a nurse visit for a BP check

## 2022-02-28 ENCOUNTER — LAB ENCOUNTER (OUTPATIENT)
Dept: LAB | Facility: HOSPITAL | Age: 52
End: 2022-02-28
Attending: INTERNAL MEDICINE
Payer: COMMERCIAL

## 2022-02-28 DIAGNOSIS — D72.820 MONOCLONAL B-CELL LYMPHOCYTOSIS: ICD-10-CM

## 2022-02-28 LAB
ALBUMIN SERPL-MCNC: 4.2 G/DL (ref 3.4–5)
ALBUMIN/GLOB SERPL: 1.4 {RATIO} (ref 1–2)
ALP LIVER SERPL-CCNC: 68 U/L
ALT SERPL-CCNC: 24 U/L
ANION GAP SERPL CALC-SCNC: 3 MMOL/L (ref 0–18)
BASOPHILS # BLD AUTO: 0.05 X10(3) UL (ref 0–0.2)
BASOPHILS NFR BLD AUTO: 0.4 %
BILIRUB SERPL-MCNC: 0.4 MG/DL (ref 0.1–2)
BUN BLD-MCNC: 15 MG/DL (ref 7–18)
BUN/CREAT SERPL: 13.2 (ref 10–20)
CALCIUM BLD-MCNC: 8.6 MG/DL (ref 8.5–10.1)
CHLORIDE SERPL-SCNC: 107 MMOL/L (ref 98–112)
CO2 SERPL-SCNC: 28 MMOL/L (ref 21–32)
CREAT BLD-MCNC: 1.14 MG/DL
DEPRECATED RDW RBC AUTO: 41.8 FL (ref 35.1–46.3)
EOSINOPHIL # BLD AUTO: 0.15 X10(3) UL (ref 0–0.7)
EOSINOPHIL NFR BLD AUTO: 1.2 %
ERYTHROCYTE [DISTWIDTH] IN BLOOD BY AUTOMATED COUNT: 12.1 % (ref 11–15)
FASTING STATUS PATIENT QL REPORTED: NO
GLOBULIN PLAS-MCNC: 3 G/DL (ref 2.8–4.4)
GLUCOSE BLD-MCNC: 110 MG/DL (ref 70–99)
HCT VFR BLD AUTO: 46.5 %
HGB BLD-MCNC: 15.7 G/DL
IMM GRANULOCYTES # BLD AUTO: 0.02 X10(3) UL (ref 0–1)
IMM GRANULOCYTES NFR BLD: 0.2 %
LYMPHOCYTES # BLD AUTO: 7.3 X10(3) UL (ref 1–4)
LYMPHOCYTES NFR BLD AUTO: 60.4 %
MCH RBC QN AUTO: 31.4 PG (ref 26–34)
MCHC RBC AUTO-ENTMCNC: 33.8 G/DL (ref 31–37)
MCV RBC AUTO: 93 FL
MONOCYTES # BLD AUTO: 0.58 X10(3) UL (ref 0.1–1)
MONOCYTES NFR BLD AUTO: 4.8 %
NEUTROPHILS # BLD AUTO: 3.98 X10 (3) UL (ref 1.5–7.7)
NEUTROPHILS # BLD AUTO: 3.98 X10(3) UL (ref 1.5–7.7)
NEUTROPHILS NFR BLD AUTO: 33 %
OSMOLALITY SERPL CALC.SUM OF ELEC: 287 MOSM/KG (ref 275–295)
PLATELET # BLD AUTO: 169 10(3)UL (ref 150–450)
POTASSIUM SERPL-SCNC: 4.2 MMOL/L (ref 3.5–5.1)
PROT SERPL-MCNC: 7.2 G/DL (ref 6.4–8.2)
RBC # BLD AUTO: 5 X10(6)UL
SODIUM SERPL-SCNC: 138 MMOL/L (ref 136–145)
WBC # BLD AUTO: 12.1 X10(3) UL (ref 4–11)

## 2022-02-28 PROCEDURE — 88189 FLOWCYTOMETRY/READ 16 & >: CPT

## 2022-02-28 PROCEDURE — 80053 COMPREHEN METABOLIC PANEL: CPT

## 2022-02-28 PROCEDURE — 88184 FLOWCYTOMETRY/ TC 1 MARKER: CPT

## 2022-02-28 PROCEDURE — 36415 COLL VENOUS BLD VENIPUNCTURE: CPT

## 2022-02-28 PROCEDURE — 88185 FLOWCYTOMETRY/TC ADD-ON: CPT

## 2022-02-28 PROCEDURE — 85025 COMPLETE CBC W/AUTO DIFF WBC: CPT

## 2022-03-01 LAB
CD10 CELLS NFR SPEC: 3 %
CD11C CELLS NFR SPEC: 11 %
CD14 CELLS NFR SPEC: 1 %
CD19 CELLS NFR SPEC: 62 %
CD19/CD10 CELLS: 2 %
CD20 CELLS NFR SPEC: 62 %
CD22 CELLS NFR SPEC: 62 %
CD23 CELLS NFR SPEC: <1 %
CD3 CELLS NFR SPEC: 35 %
CD3+CD4+ CELLS NFR SPEC: 27 %
CD3+CD8+ CELLS NFR SPEC: 8 %
CD45 CELLS NFR SPEC: 100 %
CD5 CELLS NFR SPEC: 77 %
CD5/CD19 CELLS: 44 %
CD56 CELLS NFR SPEC: 4 %
CD7 CELLS NFR SPEC: 34 %
CELL SURF KAPPA/LAMBDA RATIO: 61
CELL SURF LAMBDA LIGHT CHAIN: 1 %
CELL SURFACE KAPPA LIGHT CHAIN: 61 %
FMC7 CELLS NFR SPEC: 29 %

## 2022-03-02 ENCOUNTER — OFFICE VISIT (OUTPATIENT)
Dept: HEMATOLOGY/ONCOLOGY | Facility: HOSPITAL | Age: 52
End: 2022-03-02
Attending: INTERNAL MEDICINE
Payer: COMMERCIAL

## 2022-03-02 VITALS
HEART RATE: 64 BPM | TEMPERATURE: 98 F | WEIGHT: 210 LBS | BODY MASS INDEX: 29.4 KG/M2 | HEIGHT: 71 IN | RESPIRATION RATE: 16 BRPM | OXYGEN SATURATION: 98 % | SYSTOLIC BLOOD PRESSURE: 150 MMHG | DIASTOLIC BLOOD PRESSURE: 84 MMHG

## 2022-03-02 DIAGNOSIS — C91.10 CLL (CHRONIC LYMPHOCYTIC LEUKEMIA) (HCC): Primary | ICD-10-CM

## 2022-03-02 LAB
LDH SERPL L TO P-CCNC: 153 U/L
URATE SERPL-MCNC: 6.1 MG/DL

## 2022-03-02 PROCEDURE — 99214 OFFICE O/P EST MOD 30 MIN: CPT | Performed by: INTERNAL MEDICINE

## 2022-03-03 LAB — B2 MICROGLOB SERPL-MCNC: 0.25 MG/DL (ref 0.11–0.25)

## 2022-05-31 ENCOUNTER — LAB ENCOUNTER (OUTPATIENT)
Dept: LAB | Facility: HOSPITAL | Age: 52
End: 2022-05-31
Attending: INTERNAL MEDICINE
Payer: COMMERCIAL

## 2022-05-31 DIAGNOSIS — I10 ESSENTIAL HYPERTENSION: ICD-10-CM

## 2022-05-31 DIAGNOSIS — C91.10 CLL (CHRONIC LYMPHOCYTIC LEUKEMIA) (HCC): ICD-10-CM

## 2022-05-31 LAB
ALBUMIN SERPL-MCNC: 4 G/DL (ref 3.4–5)
ALBUMIN/GLOB SERPL: 1.2 {RATIO} (ref 1–2)
ALP LIVER SERPL-CCNC: 67 U/L
ALT SERPL-CCNC: 24 U/L
ANION GAP SERPL CALC-SCNC: 5 MMOL/L (ref 0–18)
AST SERPL-CCNC: 16 U/L (ref 15–37)
B2 MICROGLOB SERPL-MCNC: 0.26 MG/DL (ref 0.11–0.25)
BASOPHILS # BLD AUTO: 0.05 X10(3) UL (ref 0–0.2)
BASOPHILS NFR BLD AUTO: 0.4 %
BILIRUB SERPL-MCNC: 0.6 MG/DL (ref 0.1–2)
BUN BLD-MCNC: 14 MG/DL (ref 7–18)
BUN/CREAT SERPL: 13.1 (ref 10–20)
CALCIUM BLD-MCNC: 9.1 MG/DL (ref 8.5–10.1)
CHLORIDE SERPL-SCNC: 107 MMOL/L (ref 98–112)
CO2 SERPL-SCNC: 29 MMOL/L (ref 21–32)
CREAT BLD-MCNC: 1.07 MG/DL
DEPRECATED RDW RBC AUTO: 43.4 FL (ref 35.1–46.3)
EOSINOPHIL # BLD AUTO: 0.18 X10(3) UL (ref 0–0.7)
EOSINOPHIL NFR BLD AUTO: 1.5 %
ERYTHROCYTE [DISTWIDTH] IN BLOOD BY AUTOMATED COUNT: 12.5 % (ref 11–15)
FASTING STATUS PATIENT QL REPORTED: YES
GLOBULIN PLAS-MCNC: 3.3 G/DL (ref 2.8–4.4)
GLUCOSE BLD-MCNC: 95 MG/DL (ref 70–99)
HCT VFR BLD AUTO: 46.3 %
HGB BLD-MCNC: 15.4 G/DL
IMM GRANULOCYTES # BLD AUTO: 0.02 X10(3) UL (ref 0–1)
IMM GRANULOCYTES NFR BLD: 0.2 %
LDH SERPL L TO P-CCNC: 137 U/L
LYMPHOCYTES # BLD AUTO: 7.81 X10(3) UL (ref 1–4)
LYMPHOCYTES NFR BLD AUTO: 64.6 %
MCH RBC QN AUTO: 31.4 PG (ref 26–34)
MCHC RBC AUTO-ENTMCNC: 33.3 G/DL (ref 31–37)
MCV RBC AUTO: 94.3 FL
MONOCYTES # BLD AUTO: 0.55 X10(3) UL (ref 0.1–1)
MONOCYTES NFR BLD AUTO: 4.5 %
NEUTROPHILS # BLD AUTO: 3.48 X10 (3) UL (ref 1.5–7.7)
NEUTROPHILS # BLD AUTO: 3.48 X10(3) UL (ref 1.5–7.7)
NEUTROPHILS NFR BLD AUTO: 28.8 %
OSMOLALITY SERPL CALC.SUM OF ELEC: 292 MOSM/KG (ref 275–295)
PLATELET # BLD AUTO: 152 10(3)UL (ref 150–450)
POTASSIUM SERPL-SCNC: 4.5 MMOL/L (ref 3.5–5.1)
PROT SERPL-MCNC: 7.3 G/DL (ref 6.4–8.2)
RBC # BLD AUTO: 4.91 X10(6)UL
SODIUM SERPL-SCNC: 141 MMOL/L (ref 136–145)
URATE SERPL-MCNC: 6.9 MG/DL
WBC # BLD AUTO: 12.1 X10(3) UL (ref 4–11)

## 2022-05-31 PROCEDURE — 82232 ASSAY OF BETA-2 PROTEIN: CPT

## 2022-05-31 PROCEDURE — 36415 COLL VENOUS BLD VENIPUNCTURE: CPT

## 2022-05-31 PROCEDURE — 81263 IGH VARI REGIONAL MUTATION: CPT

## 2022-05-31 PROCEDURE — 83615 LACTATE (LD) (LDH) ENZYME: CPT

## 2022-05-31 PROCEDURE — 84550 ASSAY OF BLOOD/URIC ACID: CPT

## 2022-05-31 PROCEDURE — 80053 COMPREHEN METABOLIC PANEL: CPT

## 2022-05-31 PROCEDURE — 85025 COMPLETE CBC W/AUTO DIFF WBC: CPT

## 2022-05-31 NOTE — TELEPHONE ENCOUNTER
Hypertension Medications Protocol Failed 05/31/2022 03:25 PM   Protocol Details  Appointment in past 6 or next 3 months    CMP or BMP in past 12 months    Last serum creatinine< 2.0     Due for px and labs  Future Appointments   Date Time Provider Jolene Crowe   6/1/2022  4:00 PM Rubén Hicks  [facility] sent

## 2022-06-01 ENCOUNTER — OFFICE VISIT (OUTPATIENT)
Dept: HEMATOLOGY/ONCOLOGY | Facility: HOSPITAL | Age: 52
End: 2022-06-01
Attending: INTERNAL MEDICINE
Payer: COMMERCIAL

## 2022-06-01 VITALS
WEIGHT: 207 LBS | HEIGHT: 71 IN | RESPIRATION RATE: 16 BRPM | DIASTOLIC BLOOD PRESSURE: 92 MMHG | OXYGEN SATURATION: 97 % | SYSTOLIC BLOOD PRESSURE: 151 MMHG | BODY MASS INDEX: 28.98 KG/M2 | HEART RATE: 72 BPM | TEMPERATURE: 98 F

## 2022-06-01 DIAGNOSIS — C91.10 CLL (CHRONIC LYMPHOCYTIC LEUKEMIA) (HCC): Primary | ICD-10-CM

## 2022-06-01 PROCEDURE — 99214 OFFICE O/P EST MOD 30 MIN: CPT | Performed by: INTERNAL MEDICINE

## 2022-06-03 RX ORDER — LOSARTAN POTASSIUM 50 MG/1
TABLET ORAL
Qty: 30 TABLET | Refills: 0 | Status: SHIPPED | OUTPATIENT
Start: 2022-06-03

## 2022-07-30 DIAGNOSIS — I10 ESSENTIAL HYPERTENSION: ICD-10-CM

## 2022-08-01 DIAGNOSIS — I10 ESSENTIAL HYPERTENSION: ICD-10-CM

## 2022-08-01 NOTE — TELEPHONE ENCOUNTER
Hypertension Medications Protocol Failed 07/30/2022 03:10 PM   Protocol Details  Appointment in past 6 or next 3 months    CMP or BMP in past 12 months    Last serum creatinine< 2.0     Due for px  [facility] sent  Future Appointments   Date Time Provider Jolene Crowe   12/7/2022  4:00 PM Rubén Hicks MD [facility] HEM ONC EMO

## 2022-08-01 NOTE — TELEPHONE ENCOUNTER
Future Appointments   Date Time Provider Jolene Crowe   12/7/2022  4:00 PM Margo Qureshi MD [address] [place] [address] HEM ONC EMO

## 2022-08-01 NOTE — TELEPHONE ENCOUNTER
Hypertension Medications Protocol Failed 08/01/2022 09:06 AM   Protocol Details  Appointment in past 6 or next 3 months    CMP or BMP in past 12 months    Last serum creatinine< 2.0     Due for Px  [facility] sent  Future Appointments   Date Time Provider Jolene Crowe   12/7/2022  4:00 PM Reggie Rosenberg  [facility] HEM ONC EMO

## 2022-08-02 NOTE — TELEPHONE ENCOUNTER
Future Appointments   Date Time Provider Jolene Crowe   12/7/2022  4:00 PM Srinath Dye MD [address] [place] [address] HEM ONC EMO

## 2022-08-03 RX ORDER — LOSARTAN POTASSIUM 50 MG/1
50 TABLET ORAL DAILY
Qty: 30 TABLET | Refills: 0 | Status: SHIPPED | OUTPATIENT
Start: 2022-08-03

## 2022-08-03 RX ORDER — LOSARTAN POTASSIUM 50 MG/1
TABLET ORAL
Qty: 90 TABLET | Refills: 0 | OUTPATIENT
Start: 2022-08-03

## 2022-08-06 DIAGNOSIS — I10 ESSENTIAL HYPERTENSION: ICD-10-CM

## 2022-08-06 RX ORDER — LOSARTAN POTASSIUM 50 MG/1
TABLET ORAL
Qty: 30 TABLET | Refills: 0 | OUTPATIENT
Start: 2022-08-06

## 2022-09-13 DIAGNOSIS — I10 ESSENTIAL HYPERTENSION: ICD-10-CM

## 2022-09-15 RX ORDER — LOSARTAN POTASSIUM 50 MG/1
50 TABLET ORAL DAILY
Qty: 30 TABLET | Refills: 0 | Status: SHIPPED | OUTPATIENT
Start: 2022-09-15

## 2022-09-15 NOTE — TELEPHONE ENCOUNTER
Future Appointments   Date Time Provider Jolene Crowe   12/7/2022  4:00 PM Enrique Haque  [facility] HEM ONC EMO
Hypertension Medications Protocol Failed 09/13/2022 05:53 AM   Protocol Details  Appointment in past 6 or next 3 months    CMP or BMP in past 12 months    Last serum creatinine< 2.0     Last refill 8/3/22 #30 0 refill  Last OV 4/6/21  Future Appointments   Date Time Provider Jolene Crowe   12/7/2022  4:00 PM Shanta Paige MD [facility] HEM ONC EMO      Also see refill enc from 8/1/22
Attending with

## 2022-10-20 DIAGNOSIS — I10 ESSENTIAL HYPERTENSION: ICD-10-CM

## 2022-10-20 NOTE — TELEPHONE ENCOUNTER
Hypertension Medications Protocol Failed 10/20/2022 03:01 PM   Protocol Details  Appointment in past 6 or next 3 months    CMP or BMP in past 12 months    Last serum creatinine< 2.0     Last refill 9/15/22 30 0 refill  Overdue for Px  Future Appointments   Date Time Provider Jolene Crowe   12/7/2022  4:00 PM Randi Rg  [facility] sent

## 2022-10-31 RX ORDER — LOSARTAN POTASSIUM 50 MG/1
TABLET ORAL
Qty: 30 TABLET | Refills: 0 | OUTPATIENT
Start: 2022-10-31

## 2022-12-02 ENCOUNTER — TELEPHONE (OUTPATIENT)
Dept: FAMILY MEDICINE CLINIC | Facility: CLINIC | Age: 52
End: 2022-12-02

## 2022-12-02 DIAGNOSIS — I10 ESSENTIAL HYPERTENSION: ICD-10-CM

## 2022-12-02 DIAGNOSIS — Z00.00 ANNUAL PHYSICAL EXAM: Primary | ICD-10-CM

## 2022-12-02 NOTE — TELEPHONE ENCOUNTER
Pt called to schedule px. Per pt he is going to be getting blood work done by other DR. And would like the orders to be put in so he can have them all taken at the same time.    Future Appointments   Date Time Provider Jolene Crowe   12/7/2022  4:00 PM Jarett Chairez MD [address] [place] [address] HEM ONC EMO   1/9/2023 10:15 AM Morenita Keen MD [facility]

## 2022-12-06 ENCOUNTER — LAB ENCOUNTER (OUTPATIENT)
Dept: LAB | Facility: HOSPITAL | Age: 52
End: 2022-12-06
Attending: INTERNAL MEDICINE
Payer: COMMERCIAL

## 2022-12-06 DIAGNOSIS — C91.10 CLL (CHRONIC LYMPHOCYTIC LEUKEMIA) (HCC): ICD-10-CM

## 2022-12-06 DIAGNOSIS — Z00.00 ANNUAL PHYSICAL EXAM: ICD-10-CM

## 2022-12-06 DIAGNOSIS — I10 ESSENTIAL HYPERTENSION: ICD-10-CM

## 2022-12-06 LAB
ALBUMIN SERPL-MCNC: 4.1 G/DL (ref 3.4–5)
ALBUMIN/GLOB SERPL: 1.4 {RATIO} (ref 1–2)
ALP LIVER SERPL-CCNC: 66 U/L
ALT SERPL-CCNC: 24 U/L
ANION GAP SERPL CALC-SCNC: 3 MMOL/L (ref 0–18)
AST SERPL-CCNC: 14 U/L (ref 15–37)
BASOPHILS # BLD AUTO: 0.04 X10(3) UL (ref 0–0.2)
BASOPHILS NFR BLD AUTO: 0.3 %
BILIRUB SERPL-MCNC: 0.5 MG/DL (ref 0.1–2)
BUN BLD-MCNC: 17 MG/DL (ref 7–18)
BUN/CREAT SERPL: 14.9 (ref 10–20)
CALCIUM BLD-MCNC: 9.2 MG/DL (ref 8.5–10.1)
CHLORIDE SERPL-SCNC: 106 MMOL/L (ref 98–112)
CHOLEST SERPL-MCNC: 155 MG/DL (ref ?–200)
CO2 SERPL-SCNC: 31 MMOL/L (ref 21–32)
COMPLEXED PSA SERPL-MCNC: 1.74 NG/ML (ref ?–4)
CREAT BLD-MCNC: 1.14 MG/DL
DEPRECATED RDW RBC AUTO: 43.4 FL (ref 35.1–46.3)
EOSINOPHIL # BLD AUTO: 0.2 X10(3) UL (ref 0–0.7)
EOSINOPHIL NFR BLD AUTO: 1.7 %
ERYTHROCYTE [DISTWIDTH] IN BLOOD BY AUTOMATED COUNT: 12.4 % (ref 11–15)
EST. AVERAGE GLUCOSE BLD GHB EST-MCNC: 100 MG/DL (ref 68–126)
FASTING PATIENT LIPID ANSWER: YES
FASTING STATUS PATIENT QL REPORTED: YES
GFR SERPLBLD BASED ON 1.73 SQ M-ARVRAT: 76 ML/MIN/1.73M2 (ref 60–?)
GLOBULIN PLAS-MCNC: 3 G/DL (ref 2.8–4.4)
GLUCOSE BLD-MCNC: 89 MG/DL (ref 70–99)
HBA1C MFR BLD: 5.1 % (ref ?–5.7)
HCT VFR BLD AUTO: 47.2 %
HDLC SERPL-MCNC: 43 MG/DL (ref 40–59)
HGB BLD-MCNC: 15.8 G/DL
IMM GRANULOCYTES # BLD AUTO: 0.02 X10(3) UL (ref 0–1)
IMM GRANULOCYTES NFR BLD: 0.2 %
LDH SERPL L TO P-CCNC: 206 U/L
LDLC SERPL CALC-MCNC: 93 MG/DL (ref ?–100)
LYMPHOCYTES # BLD AUTO: 7.62 X10(3) UL (ref 1–4)
LYMPHOCYTES NFR BLD AUTO: 65 %
MCH RBC QN AUTO: 31.5 PG (ref 26–34)
MCHC RBC AUTO-ENTMCNC: 33.5 G/DL (ref 31–37)
MCV RBC AUTO: 94 FL
MONOCYTES # BLD AUTO: 0.49 X10(3) UL (ref 0.1–1)
MONOCYTES NFR BLD AUTO: 4.2 %
NEUTROPHILS # BLD AUTO: 3.36 X10 (3) UL (ref 1.5–7.7)
NEUTROPHILS # BLD AUTO: 3.36 X10(3) UL (ref 1.5–7.7)
NEUTROPHILS NFR BLD AUTO: 28.6 %
NONHDLC SERPL-MCNC: 112 MG/DL (ref ?–130)
OSMOLALITY SERPL CALC.SUM OF ELEC: 291 MOSM/KG (ref 275–295)
PLATELET # BLD AUTO: 156 10(3)UL (ref 150–450)
POTASSIUM SERPL-SCNC: 4.6 MMOL/L (ref 3.5–5.1)
PROT SERPL-MCNC: 7.1 G/DL (ref 6.4–8.2)
RBC # BLD AUTO: 5.02 X10(6)UL
SODIUM SERPL-SCNC: 140 MMOL/L (ref 136–145)
TRIGL SERPL-MCNC: 101 MG/DL (ref 30–149)
TSI SER-ACNC: 1.27 MIU/ML (ref 0.36–3.74)
URATE SERPL-MCNC: 7.1 MG/DL
VLDLC SERPL CALC-MCNC: 16 MG/DL (ref 0–30)
WBC # BLD AUTO: 11.7 X10(3) UL (ref 4–11)

## 2022-12-06 PROCEDURE — 80061 LIPID PANEL: CPT

## 2022-12-06 PROCEDURE — 84443 ASSAY THYROID STIM HORMONE: CPT

## 2022-12-06 PROCEDURE — 80053 COMPREHEN METABOLIC PANEL: CPT

## 2022-12-06 PROCEDURE — 83036 HEMOGLOBIN GLYCOSYLATED A1C: CPT

## 2022-12-06 PROCEDURE — 84550 ASSAY OF BLOOD/URIC ACID: CPT

## 2022-12-06 PROCEDURE — 36415 COLL VENOUS BLD VENIPUNCTURE: CPT

## 2022-12-06 PROCEDURE — 83615 LACTATE (LD) (LDH) ENZYME: CPT

## 2022-12-06 PROCEDURE — 85025 COMPLETE CBC W/AUTO DIFF WBC: CPT

## 2022-12-06 PROCEDURE — 85060 BLOOD SMEAR INTERPRETATION: CPT

## 2022-12-07 ENCOUNTER — OFFICE VISIT (OUTPATIENT)
Dept: HEMATOLOGY/ONCOLOGY | Facility: HOSPITAL | Age: 52
End: 2022-12-07
Attending: INTERNAL MEDICINE
Payer: COMMERCIAL

## 2022-12-07 VITALS
BODY MASS INDEX: 28.7 KG/M2 | OXYGEN SATURATION: 98 % | HEART RATE: 74 BPM | WEIGHT: 205 LBS | DIASTOLIC BLOOD PRESSURE: 92 MMHG | HEIGHT: 71 IN | TEMPERATURE: 98 F | SYSTOLIC BLOOD PRESSURE: 149 MMHG | RESPIRATION RATE: 16 BRPM

## 2022-12-07 DIAGNOSIS — C91.10 CLL (CHRONIC LYMPHOCYTIC LEUKEMIA) (HCC): Primary | ICD-10-CM

## 2022-12-07 PROCEDURE — 99211 OFF/OP EST MAY X REQ PHY/QHP: CPT

## 2023-02-03 ENCOUNTER — OFFICE VISIT (OUTPATIENT)
Dept: FAMILY MEDICINE CLINIC | Facility: CLINIC | Age: 53
End: 2023-02-03
Payer: COMMERCIAL

## 2023-02-03 VITALS
HEART RATE: 78 BPM | HEIGHT: 71 IN | SYSTOLIC BLOOD PRESSURE: 144 MMHG | RESPIRATION RATE: 18 BRPM | OXYGEN SATURATION: 98 % | WEIGHT: 202 LBS | DIASTOLIC BLOOD PRESSURE: 88 MMHG | TEMPERATURE: 97 F | BODY MASS INDEX: 28.28 KG/M2

## 2023-02-03 DIAGNOSIS — I10 ESSENTIAL HYPERTENSION: ICD-10-CM

## 2023-02-03 DIAGNOSIS — Z51.81 THERAPEUTIC DRUG MONITORING: ICD-10-CM

## 2023-02-03 DIAGNOSIS — Z00.00 ANNUAL PHYSICAL EXAM: Primary | ICD-10-CM

## 2023-02-03 PROCEDURE — 3079F DIAST BP 80-89 MM HG: CPT | Performed by: FAMILY MEDICINE

## 2023-02-03 PROCEDURE — 99396 PREV VISIT EST AGE 40-64: CPT | Performed by: FAMILY MEDICINE

## 2023-02-03 PROCEDURE — 3077F SYST BP >= 140 MM HG: CPT | Performed by: FAMILY MEDICINE

## 2023-02-03 PROCEDURE — 3008F BODY MASS INDEX DOCD: CPT | Performed by: FAMILY MEDICINE

## 2023-02-03 RX ORDER — LOSARTAN POTASSIUM 25 MG/1
25 TABLET ORAL DAILY
Qty: 90 TABLET | Refills: 0 | Status: SHIPPED | OUTPATIENT
Start: 2023-02-03 | End: 2023-05-04

## 2023-05-01 RX ORDER — LOSARTAN POTASSIUM 25 MG/1
TABLET ORAL
Qty: 90 TABLET | Refills: 0 | Status: SHIPPED | OUTPATIENT
Start: 2023-05-01

## 2023-05-23 ENCOUNTER — PATIENT OUTREACH (OUTPATIENT)
Dept: FAMILY MEDICINE CLINIC | Facility: CLINIC | Age: 53
End: 2023-05-23

## 2023-06-06 ENCOUNTER — TELEPHONE (OUTPATIENT)
Dept: HEMATOLOGY/ONCOLOGY | Facility: HOSPITAL | Age: 53
End: 2023-06-06

## 2023-06-06 NOTE — TELEPHONE ENCOUNTER
Called and unable to reach patient. Left VM to remind him there are labs ordered by Dr. Rolando Calderon that he would like him to have done before his appointment with him on Wednesday 6/07/23 at 4:00 pm. Told him the labs can be drawn at any [place] lab location. Told him if he's able to go to the lab today or early tomorrow, that would be ideal. Let him know to call our office with any questions. Provided office phone number.

## 2023-06-08 ENCOUNTER — TELEPHONE (OUTPATIENT)
Dept: HEMATOLOGY/ONCOLOGY | Facility: HOSPITAL | Age: 53
End: 2023-06-08

## 2023-06-13 ENCOUNTER — TELEPHONE (OUTPATIENT)
Dept: HEMATOLOGY/ONCOLOGY | Facility: HOSPITAL | Age: 53
End: 2023-06-13

## 2023-06-13 NOTE — TELEPHONE ENCOUNTER
[facility] to see if the patient has updated his  Insurance from what's in the system, If the patient have changed insurance then only can we make an appointment with Dr. Robin Snider.  Called 6/13/23

## 2023-07-07 ENCOUNTER — TELEPHONE (OUTPATIENT)
Dept: FAMILY MEDICINE CLINIC | Facility: CLINIC | Age: 53
End: 2023-07-07

## 2023-07-07 DIAGNOSIS — L97.522 ULCER OF LEFT FOOT, WITH FAT LAYER EXPOSED (HCC): Primary | ICD-10-CM

## 2023-07-07 DIAGNOSIS — G60.9 HEREDITARY AND IDIOPATHIC NEUROPATHY, UNSPECIFIED: ICD-10-CM

## 2023-07-07 NOTE — TELEPHONE ENCOUNTER
Patient advised and verbalized understanding. Patient states he will call back to schedule his BP apt.

## 2023-07-07 NOTE — TELEPHONE ENCOUNTER
Pt called wondering if he can get his referral renewed for his podiatrist Trace Romo.  Please advise

## 2023-08-29 ENCOUNTER — LAB ENCOUNTER (OUTPATIENT)
Dept: LAB | Facility: HOSPITAL | Age: 53
End: 2023-08-29
Attending: INTERNAL MEDICINE
Payer: COMMERCIAL

## 2023-08-29 DIAGNOSIS — C91.10 CLL (CHRONIC LYMPHOCYTIC LEUKEMIA) (HCC): ICD-10-CM

## 2023-08-29 LAB
ALBUMIN SERPL-MCNC: 4.1 G/DL (ref 3.4–5)
ALBUMIN/GLOB SERPL: 1.3 {RATIO} (ref 1–2)
ALP LIVER SERPL-CCNC: 66 U/L
ALT SERPL-CCNC: 22 U/L
ANION GAP SERPL CALC-SCNC: 5 MMOL/L (ref 0–18)
AST SERPL-CCNC: 13 U/L (ref 15–37)
BILIRUB SERPL-MCNC: 0.4 MG/DL (ref 0.1–2)
BUN BLD-MCNC: 12 MG/DL (ref 7–18)
BUN/CREAT SERPL: 10.4 (ref 10–20)
CALCIUM BLD-MCNC: 9 MG/DL (ref 8.5–10.1)
CHLORIDE SERPL-SCNC: 105 MMOL/L (ref 98–112)
CO2 SERPL-SCNC: 29 MMOL/L (ref 21–32)
CREAT BLD-MCNC: 1.15 MG/DL
EGFRCR SERPLBLD CKD-EPI 2021: 75 ML/MIN/1.73M2 (ref 60–?)
FASTING STATUS PATIENT QL REPORTED: NO
GLOBULIN PLAS-MCNC: 3.1 G/DL (ref 2.8–4.4)
GLUCOSE BLD-MCNC: 90 MG/DL (ref 70–99)
LDH SERPL L TO P-CCNC: 130 U/L
OSMOLALITY SERPL CALC.SUM OF ELEC: 287 MOSM/KG (ref 275–295)
POTASSIUM SERPL-SCNC: 4.6 MMOL/L (ref 3.5–5.1)
PROT SERPL-MCNC: 7.2 G/DL (ref 6.4–8.2)
SODIUM SERPL-SCNC: 139 MMOL/L (ref 136–145)

## 2023-08-29 PROCEDURE — 83615 LACTATE (LD) (LDH) ENZYME: CPT

## 2023-08-29 PROCEDURE — 85025 COMPLETE CBC W/AUTO DIFF WBC: CPT

## 2023-08-29 PROCEDURE — 85060 BLOOD SMEAR INTERPRETATION: CPT

## 2023-08-29 PROCEDURE — 80053 COMPREHEN METABOLIC PANEL: CPT

## 2023-08-29 PROCEDURE — 36415 COLL VENOUS BLD VENIPUNCTURE: CPT

## 2023-08-30 ENCOUNTER — OFFICE VISIT (OUTPATIENT)
Dept: HEMATOLOGY/ONCOLOGY | Facility: HOSPITAL | Age: 53
End: 2023-08-30
Attending: INTERNAL MEDICINE
Payer: COMMERCIAL

## 2023-08-30 VITALS
SYSTOLIC BLOOD PRESSURE: 157 MMHG | HEIGHT: 71 IN | HEART RATE: 76 BPM | RESPIRATION RATE: 20 BRPM | DIASTOLIC BLOOD PRESSURE: 88 MMHG | TEMPERATURE: 98 F | OXYGEN SATURATION: 99 % | BODY MASS INDEX: 29.01 KG/M2 | WEIGHT: 207.19 LBS

## 2023-08-30 DIAGNOSIS — C91.10 CLL (CHRONIC LYMPHOCYTIC LEUKEMIA) (HCC): Primary | ICD-10-CM

## 2023-08-30 LAB
BASOPHILS # BLD AUTO: 0.07 X10(3) UL (ref 0–0.2)
BASOPHILS NFR BLD AUTO: 0.5 %
DEPRECATED RDW RBC AUTO: 40.5 FL (ref 35.1–46.3)
EOSINOPHIL # BLD AUTO: 0.21 X10(3) UL (ref 0–0.7)
EOSINOPHIL NFR BLD AUTO: 1.6 %
ERYTHROCYTE [DISTWIDTH] IN BLOOD BY AUTOMATED COUNT: 12.2 % (ref 11–15)
HCT VFR BLD AUTO: 45.5 %
HGB BLD-MCNC: 16.1 G/DL
IMM GRANULOCYTES # BLD AUTO: 0.02 X10(3) UL (ref 0–1)
IMM GRANULOCYTES NFR BLD: 0.2 %
LYMPHOCYTES # BLD AUTO: 8.71 X10(3) UL (ref 1–4)
LYMPHOCYTES NFR BLD AUTO: 67.9 %
MCH RBC QN AUTO: 32.1 PG (ref 26–34)
MCHC RBC AUTO-ENTMCNC: 35.4 G/DL (ref 31–37)
MCV RBC AUTO: 90.8 FL
MONOCYTES # BLD AUTO: 0.64 X10(3) UL (ref 0.1–1)
MONOCYTES NFR BLD AUTO: 5 %
NEUTROPHILS # BLD AUTO: 3.17 X10 (3) UL (ref 1.5–7.7)
NEUTROPHILS # BLD AUTO: 3.17 X10(3) UL (ref 1.5–7.7)
NEUTROPHILS NFR BLD AUTO: 24.8 %
PLATELET # BLD AUTO: 159 10(3)UL (ref 150–450)
RBC # BLD AUTO: 5.01 X10(6)UL
WBC # BLD AUTO: 12.8 X10(3) UL (ref 4–11)

## 2023-08-30 PROCEDURE — 99213 OFFICE O/P EST LOW 20 MIN: CPT | Performed by: INTERNAL MEDICINE

## 2024-01-08 ENCOUNTER — TELEPHONE (OUTPATIENT)
Dept: FAMILY MEDICINE CLINIC | Facility: CLINIC | Age: 54
End: 2024-01-08

## 2024-01-08 NOTE — TELEPHONE ENCOUNTER
Pt has covid sx for 10 days,  he would like to discuss with a nurse.  States his sx are better than they were, but still has a slight cough and post nasal drip,  He is only taking otc as needed

## 2024-01-08 NOTE — TELEPHONE ENCOUNTER
Called pt to discuss.  Currently has a runny nose.  COVID test is still showing positive after 10 days.  Pt notified per the CDC the quarantine time is 5 days followed by 5 additional days of mask wearing.  Pt advised he should be fine to return to work wearing a mask if he continues to have symptoms however retesting is not a requirement by the CDC.  Pt states he will discuss with his employer.

## 2024-02-27 ENCOUNTER — LAB ENCOUNTER (OUTPATIENT)
Dept: LAB | Facility: HOSPITAL | Age: 54
End: 2024-02-27
Attending: INTERNAL MEDICINE
Payer: COMMERCIAL

## 2024-02-27 DIAGNOSIS — C91.10 CLL (CHRONIC LYMPHOCYTIC LEUKEMIA) (HCC): ICD-10-CM

## 2024-02-27 LAB
ALBUMIN SERPL-MCNC: 4.5 G/DL (ref 3.2–4.8)
ALBUMIN/GLOB SERPL: 1.6 {RATIO} (ref 1–2)
ALP LIVER SERPL-CCNC: 66 U/L
ALT SERPL-CCNC: 19 U/L
ANION GAP SERPL CALC-SCNC: 4 MMOL/L (ref 0–18)
AST SERPL-CCNC: 18 U/L (ref ?–34)
BASOPHILS # BLD AUTO: 0.06 X10(3) UL (ref 0–0.2)
BASOPHILS NFR BLD AUTO: 0.5 %
BILIRUB SERPL-MCNC: 0.8 MG/DL (ref 0.3–1.2)
BUN BLD-MCNC: 13 MG/DL (ref 9–23)
BUN/CREAT SERPL: 13 (ref 10–20)
CALCIUM BLD-MCNC: 9.3 MG/DL (ref 8.7–10.4)
CHLORIDE SERPL-SCNC: 108 MMOL/L (ref 98–112)
CO2 SERPL-SCNC: 29 MMOL/L (ref 21–32)
CREAT BLD-MCNC: 1 MG/DL
DEPRECATED RDW RBC AUTO: 42.6 FL (ref 35.1–46.3)
EGFRCR SERPLBLD CKD-EPI 2021: 89 ML/MIN/1.73M2 (ref 60–?)
EOSINOPHIL # BLD AUTO: 0.22 X10(3) UL (ref 0–0.7)
EOSINOPHIL NFR BLD AUTO: 1.7 %
ERYTHROCYTE [DISTWIDTH] IN BLOOD BY AUTOMATED COUNT: 12.5 % (ref 11–15)
FASTING STATUS PATIENT QL REPORTED: YES
GLOBULIN PLAS-MCNC: 2.8 G/DL (ref 2.8–4.4)
GLUCOSE BLD-MCNC: 91 MG/DL (ref 70–99)
HCT VFR BLD AUTO: 45.9 %
HGB BLD-MCNC: 15.9 G/DL
IMM GRANULOCYTES # BLD AUTO: 0.02 X10(3) UL (ref 0–1)
IMM GRANULOCYTES NFR BLD: 0.2 %
LDH SERPL L TO P-CCNC: 135 U/L
LYMPHOCYTES # BLD AUTO: 8.14 X10(3) UL (ref 1–4)
LYMPHOCYTES NFR BLD AUTO: 62 %
MCH RBC QN AUTO: 32 PG (ref 26–34)
MCHC RBC AUTO-ENTMCNC: 34.6 G/DL (ref 31–37)
MCV RBC AUTO: 92.4 FL
MONOCYTES # BLD AUTO: 0.6 X10(3) UL (ref 0.1–1)
MONOCYTES NFR BLD AUTO: 4.6 %
MORPHOLOGY: NORMAL
NEUTROPHILS # BLD AUTO: 4.09 X10 (3) UL (ref 1.5–7.7)
NEUTROPHILS # BLD AUTO: 4.09 X10(3) UL (ref 1.5–7.7)
NEUTROPHILS NFR BLD AUTO: 31 %
OSMOLALITY SERPL CALC.SUM OF ELEC: 292 MOSM/KG (ref 275–295)
PLATELET # BLD AUTO: 161 10(3)UL (ref 150–450)
PLATELET MORPHOLOGY: NORMAL
POTASSIUM SERPL-SCNC: 4.1 MMOL/L (ref 3.5–5.1)
PROT SERPL-MCNC: 7.3 G/DL (ref 5.7–8.2)
RBC # BLD AUTO: 4.97 X10(6)UL
SODIUM SERPL-SCNC: 141 MMOL/L (ref 136–145)
WBC # BLD AUTO: 13.1 X10(3) UL (ref 4–11)

## 2024-02-27 PROCEDURE — 85025 COMPLETE CBC W/AUTO DIFF WBC: CPT

## 2024-02-27 PROCEDURE — 80053 COMPREHEN METABOLIC PANEL: CPT

## 2024-02-27 PROCEDURE — 83615 LACTATE (LD) (LDH) ENZYME: CPT

## 2024-02-27 PROCEDURE — 36415 COLL VENOUS BLD VENIPUNCTURE: CPT

## 2024-02-27 NOTE — TELEPHONE ENCOUNTER
Call back to patient. Patient made appt for Wednesday with Dr Luis Felipe Bateman.      Future Appointments  Date Time Provider Jolene Crowe   8/22/2018 12:20 PM Eliana Teague MD [facility]
Call from patient. For about a week he has had a blood blister on his left foot right by the arch. It hasn't healed at all and is now draining some blood. No pus, no foul odor.  Patient advised that he has a history of foot ulcers and wanted to know if Dr MARMOLEJO
I can see him on Wednesday.
Pt called stated he has a blood blister on his foot and was wondering if Dr. Angy De La Cruz could see him.
None

## 2024-02-28 ENCOUNTER — OFFICE VISIT (OUTPATIENT)
Dept: HEMATOLOGY/ONCOLOGY | Facility: HOSPITAL | Age: 54
End: 2024-02-28
Attending: INTERNAL MEDICINE
Payer: COMMERCIAL

## 2024-02-28 VITALS
OXYGEN SATURATION: 98 % | HEART RATE: 73 BPM | BODY MASS INDEX: 27.25 KG/M2 | WEIGHT: 201.19 LBS | HEIGHT: 72 IN | TEMPERATURE: 97 F | RESPIRATION RATE: 16 BRPM | DIASTOLIC BLOOD PRESSURE: 89 MMHG | SYSTOLIC BLOOD PRESSURE: 152 MMHG

## 2024-02-28 DIAGNOSIS — C91.10 CLL (CHRONIC LYMPHOCYTIC LEUKEMIA) (HCC): ICD-10-CM

## 2024-02-28 DIAGNOSIS — Z87.728 HISTORY OF SPINA BIFIDA: ICD-10-CM

## 2024-02-28 DIAGNOSIS — I10 HYPERTENSION, UNSPECIFIED TYPE: Primary | ICD-10-CM

## 2024-02-28 PROCEDURE — 99214 OFFICE O/P EST MOD 30 MIN: CPT | Performed by: INTERNAL MEDICINE

## 2024-02-28 RX ORDER — LOSARTAN POTASSIUM 25 MG/1
25 TABLET ORAL DAILY
Qty: 90 TABLET | Refills: 1 | Status: SHIPPED | OUTPATIENT
Start: 2024-02-28

## 2024-02-28 NOTE — PROGRESS NOTES
Hematology Progress Note    Patient Name: Vinnie Bae Jr.   YOB: 1970   Medical Record Number: [id number]   CSN: [id number]   Consulting Physician: Reji Cunha MD  Referring Physician(s): No ref. provider found  Date of Visit: 2/28/2024    Chief Complaint:  Chronic Lymphocytic Leukemia,CLL, Ya Stage 0    History of Present Illness:     Vinnie Bae Jr. is a 53 year old male that was seen today in the [facility] for evaluation of the above condition.  Patient has PMH relevant for spina bifida parents with several foot related surgical procedures presents for evaluation of lymphocytosis noted over the last month.  At the time of consultation, Vinnie reports being in his usual state of health over the last 6 months to 1 year.  This patient denies any systemic signs of illness.  He denies any bleeding or bruising.  He is without chest pain, dyspnea, nausea vomiting abdominal pain change in bowel habits urinary habits no change in skin or rash or neurological symptoms endorsed.  ROS is otherwise negative.    Interval History:  Patient returns for follow-up of CLL.  Vinnie reports being in his usual state of health over the last 6 months as. He denies any systemic signs of illness. Patient denies any reports bleeding, lymphadenopathy. Patient had a COVID-19 in 12/23 and lost about 10 lbs, reports his appetite has increased since the infection. His ROS is otherwise negative for any new concerns.    Past Medical History:  Past Medical History:   Diagnosis Date    Cancer (HCC)     CLL (chronic lymphocytic leukemia) (HCC)     HTN (hypertension)     Monoclonal B-cell lymphocytosis     Seasonal allergies     Spina bifida (HCC)        Past Surgical History:  Past Surgical History:   Procedure Laterality Date    AMPUTATION TOE,I-P JT  2011    no associated bleeding complications    OTHER      foot surgery, fusion    OTHER SURGICAL HISTORY      multiple surgeries in feet to adjust for spina bifida        Family Medical History:  Family History   Problem Relation Age of Onset    Heart Disorder Mother     DVT/VTE Mother 70    Prostate Cancer Father 75    Cancer Paternal Uncle 63        tobacco related; likely     Cancer Paternal Uncle 68        tobacco related; likely     Cancer Paternal Uncle 65        tobacco related; likely     DVT/VTE Maternal Aunt 37    Bleeding Disorders Neg     Pancreatic Cancer Neg     Ovarian Cancer Neg     Breast Cancer Neg        Social History:  Social History     Social History Narrative    Vinnie is  to Daniela x 4 yrs. He has no children. Patient works in [employer]. He and his wife live in [place], [place].          Allergies:   Allergies   Allergen Reactions    Radiology Contrast Iodinated Dyes ANAPHYLAXIS           Sulfa Antibiotics UNKNOWN    Dander UNKNOWN     Dog, cat     Latex RASH    Mixed Feathers UNKNOWN     Down feathers    Ragweed UNKNOWN    Shellfish-Derived Products        Current Medications:   losartan 25 MG Oral Tab Take 1 tablet (25 mg total) by mouth daily. 90 tablet 1       Review of Systems:    Constitutional: Negative for anorexia, chills, fatigue, fevers, night sweats and weight loss.  Eyes: Negative for visual disturbance, irritation and redness.  Respiratory: Negative for cough, hemoptysis, chest pain, or dyspnea on exertion.  Gastrointestinal: Negative for dysphagia, odynophagia, reflux symptoms, nausea, vomiting, change in bowel habits, diarrhea, constipation and abdominal pain.  Integument/breast: Negative for rash, skin lesions, and pruritus.  Hematologic/lymphatic: Negative for easy bruising, bleeding, and lymphadenopathy.  Musculoskeletal: Negative for myalgias, arthralgias, muscle weakness.  Neurological: Negative for headaches, dizziness, speech problems, gait problems and weakness.    A comprehensive 14 point review of systems was completed.  Pertinent positives and negatives noted in the the HPI.     Vital Signs:  /89 (BP Location:  Left arm, Patient Position: Sitting, Cuff Size: large)   Pulse 73   Temp 97.3 °F (36.3 °C) (Oral)   Resp 16   Ht 1.829 m (6')   Wt 91.3 kg (201 lb 3.2 oz)   SpO2 98%   BMI 27.29 kg/m²     Physical Examination:    General: Patient is alert and oriented x 3, not in acute distress.  Psych: Friendly, cooperative with appropriate questions and responses  HEENT: EOMs intact. Oropharynx is clear.   Neck:  No palpable lymphadenopathy. Neck is supple.  Lymphatics: There is no palpable lymphadenopathy throughout in the cervical, supraclavicular, axillary, or inguinal regions.  Chest: Clear to auscultation. No wheezes or rales.  Heart: Regular rate and rhythm. S1S2 normal.  Abdomen: Soft, non tender with good bowel sounds.  No hepatosplenomegaly.  No palpable mass.  Extremities: No edema or calf tenderness. Bilateral foot eversion  Neurological: Grossly intact. Abnormal gait secondary to spina bifida      Labs:    Lab Results   Component Value Date/Time    WBC 13.1 (H) 02/27/2024 03:18 PM    RBC 4.97 02/27/2024 03:18 PM    HGB 15.9 02/27/2024 03:18 PM    HCT 45.9 02/27/2024 03:18 PM    MCV 92.4 02/27/2024 03:18 PM    MCH 32.0 02/27/2024 03:18 PM    MCHC 34.6 02/27/2024 03:18 PM    RDW 12.5 02/27/2024 03:18 PM    NEPRELIM 4.09 02/27/2024 03:18 PM    .0 02/27/2024 03:18 PM       Lab Results   Component Value Date/Time    GLU 91 02/27/2024 03:18 PM    BUN 13 02/27/2024 03:18 PM    CREATSERUM 1.00 02/27/2024 03:18 PM    GFRNAA 79 05/31/2022 12:05 PM    CA 9.3 02/27/2024 03:18 PM    ALB 4.5 02/27/2024 03:18 PM     02/27/2024 03:18 PM    K 4.1 02/27/2024 03:18 PM     02/27/2024 03:18 PM    CO2 29.0 02/27/2024 03:18 PM    ALKPHO 66 02/27/2024 03:18 PM    AST 18 02/27/2024 03:18 PM    ALT 19 02/27/2024 03:18 PM         Impression:      ICD-10-CM    1. Hypertension, unspecified type  I10 losartan 25 MG Oral Tab          53 year old M with PMH relevant for spina bifida parents with several foot related surgical  procedures presents for evaluation of lymphocytosis, now consistent with CLL    Plan:    1.) Chronic Lymphocytic Leukemia, CLL, Ya stage 0    --His current flow cytometry meets criteria for CLL    --I have reviewed with the patient that he has stage 0 disease, low risk    --Informed the patient that CLL has certain criteria which need to be met before systemic treatment is initiated  --I have provided him with a handout on CLL.   --his baseline LDH, uric acid and beta 2 microglobulin are not elevated.   --A CLL FISH panel shows no mutations detected suggestive of favorable risk disease    --No indication for immediate bone marrow biopsy at this time based on currently lab data or his H&P    --pt is clinically well today,  he has no systemic signs of illness at this time.  --he does not need treatment for his CLL based and H&P today and labs showing no signs of anemia or thrombocytopenia  --repeat follow up in 6 months      2.) HTN    --providing a refill of his Losartan 25 mg/daily  --pt needs a new PCP, so will give him a list of providers    3.) Spina Bifida    --born with this condition; but pt is able to do some station bike exercises. Discussed exercising may help with his HTN and sleep disturbance issues    MDM: Moderate Risk    Reji Cunha MD  [facility]  [facility]  [address] [place] [id number]

## 2024-04-17 ENCOUNTER — TELEPHONE (OUTPATIENT)
Dept: FAMILY MEDICINE CLINIC | Facility: CLINIC | Age: 54
End: 2024-04-17

## 2024-04-17 NOTE — TELEPHONE ENCOUNTER
Due for Px  MCM sent  Future Appointments   Date Time Provider Department Center   8/28/2024  3:00 PM Reji Cunha MD [facility] HEM ONC EMO

## 2024-08-06 ENCOUNTER — TELEPHONE (OUTPATIENT)
Dept: FAMILY MEDICINE CLINIC | Facility: CLINIC | Age: 54
End: 2024-08-06

## 2024-08-06 DIAGNOSIS — I10 HYPERTENSION, UNSPECIFIED TYPE: ICD-10-CM

## 2024-08-06 NOTE — TELEPHONE ENCOUNTER
Vinnie goldman asking for refill on losartan 25 MG Oral Tab. He has a pcp visit with Dr Marrero on 9/10. brittany

## 2024-08-07 DIAGNOSIS — I10 HYPERTENSION, UNSPECIFIED TYPE: ICD-10-CM

## 2024-08-07 RX ORDER — LOSARTAN POTASSIUM 25 MG/1
25 TABLET ORAL DAILY
Qty: 90 TABLET | Refills: 0 | Status: SHIPPED | OUTPATIENT
Start: 2024-08-07

## 2024-08-09 RX ORDER — LOSARTAN POTASSIUM 25 MG/1
25 TABLET ORAL DAILY
Qty: 90 TABLET | Refills: 1 | OUTPATIENT
Start: 2024-08-09

## 2024-08-12 NOTE — PROGRESS NOTES
Subjective    Chief Complaint  This information was obtained from the patient  11/21/2018: Pt notes no changes. 11/30/2018 \"I feel pretty good no problems\".  12/7/2018 \" I will ask for additional referrals from my PCP like you asked, the wound looks smal Wound #1 Left, Medial, Dorsal Foot is an acute Stage 3 Pressure Injury Pressure Ulcer and has received a status of Not Healed.  Subsequent wound encounter measurements are 0.6cm length x 0.2cm width x 0.4cm depth, with an area of 0.12 sq cm and a volume of Wound #1 (Pressure Ulcer) is located on the left, medial, dorsal foot. A selective debridement with a total area debrided of 0.12 sq cm Dermis and Epidermis were removed along with devitalized tissue: biofilm, callus, exudate, fibrin, and slough.  The follo What Type Of Note Output Would You Prefer (Optional)?: Standard Output What Is The Reason For Today's Visit?: Full Body Skin Examination What Is The Reason For Today's Visit? (Being Monitored For X): the development of a new lesion How Severe Are Your Spot(S)?: mild

## 2024-08-27 ENCOUNTER — LAB ENCOUNTER (OUTPATIENT)
Dept: LAB | Facility: HOSPITAL | Age: 54
End: 2024-08-27
Attending: INTERNAL MEDICINE
Payer: COMMERCIAL

## 2024-08-27 ENCOUNTER — TELEPHONE (OUTPATIENT)
Dept: HEMATOLOGY/ONCOLOGY | Facility: HOSPITAL | Age: 54
End: 2024-08-27

## 2024-08-27 DIAGNOSIS — C91.10 CLL (CHRONIC LYMPHOCYTIC LEUKEMIA) (HCC): ICD-10-CM

## 2024-08-27 LAB
ALBUMIN SERPL-MCNC: 4.7 G/DL (ref 3.2–4.8)
ALBUMIN/GLOB SERPL: 1.8 {RATIO} (ref 1–2)
ALP LIVER SERPL-CCNC: 63 U/L
ALT SERPL-CCNC: 15 U/L
ANION GAP SERPL CALC-SCNC: 6 MMOL/L (ref 0–18)
AST SERPL-CCNC: 12 U/L (ref ?–34)
BILIRUB SERPL-MCNC: 0.8 MG/DL (ref 0.3–1.2)
BUN BLD-MCNC: 12 MG/DL (ref 9–23)
BUN/CREAT SERPL: 11.5 (ref 10–20)
CALCIUM BLD-MCNC: 9.7 MG/DL (ref 8.7–10.4)
CHLORIDE SERPL-SCNC: 106 MMOL/L (ref 98–112)
CO2 SERPL-SCNC: 28 MMOL/L (ref 21–32)
CREAT BLD-MCNC: 1.04 MG/DL
EGFRCR SERPLBLD CKD-EPI 2021: 84 ML/MIN/1.73M2 (ref 60–?)
FASTING STATUS PATIENT QL REPORTED: YES
GLOBULIN PLAS-MCNC: 2.6 G/DL (ref 2–3.5)
GLUCOSE BLD-MCNC: 88 MG/DL (ref 70–99)
LDH SERPL L TO P-CCNC: 123 U/L
OSMOLALITY SERPL CALC.SUM OF ELEC: 289 MOSM/KG (ref 275–295)
POTASSIUM SERPL-SCNC: 3.9 MMOL/L (ref 3.5–5.1)
PROT SERPL-MCNC: 7.3 G/DL (ref 5.7–8.2)
SODIUM SERPL-SCNC: 140 MMOL/L (ref 136–145)

## 2024-08-27 PROCEDURE — 83615 LACTATE (LD) (LDH) ENZYME: CPT

## 2024-08-27 PROCEDURE — 85060 BLOOD SMEAR INTERPRETATION: CPT

## 2024-08-27 PROCEDURE — 85025 COMPLETE CBC W/AUTO DIFF WBC: CPT

## 2024-08-27 PROCEDURE — 36415 COLL VENOUS BLD VENIPUNCTURE: CPT

## 2024-08-27 PROCEDURE — 80053 COMPREHEN METABOLIC PANEL: CPT

## 2024-08-27 NOTE — TELEPHONE ENCOUNTER
Called and unable to reach patient. Left VM reminding him there are labs ordered by Dr. Cunha that he wants completed before his appointment on Wednesday, 8/28 at 3:00 pm. Told him the labs can be done at any [place] outpatient lab today. If he's unable to complete labs today, then instructed to go to the [facility] lab located on [address] [place] [address] at least 1 hour before his appointment tomorrow. Told him if he's unable to complete his labs, then to call our office at [phone] to reschedule his appointment.

## 2024-08-28 ENCOUNTER — OFFICE VISIT (OUTPATIENT)
Dept: HEMATOLOGY/ONCOLOGY | Facility: HOSPITAL | Age: 54
End: 2024-08-28
Attending: INTERNAL MEDICINE
Payer: COMMERCIAL

## 2024-08-28 VITALS
HEIGHT: 72 IN | BODY MASS INDEX: 26.55 KG/M2 | SYSTOLIC BLOOD PRESSURE: 142 MMHG | WEIGHT: 196 LBS | OXYGEN SATURATION: 96 % | HEART RATE: 74 BPM | DIASTOLIC BLOOD PRESSURE: 92 MMHG | TEMPERATURE: 98 F | RESPIRATION RATE: 16 BRPM

## 2024-08-28 DIAGNOSIS — C91.10 CHRONIC LYMPHOCYTIC LEUKEMIA (HCC): Primary | ICD-10-CM

## 2024-08-28 LAB
BASOPHILS # BLD AUTO: 0.06 X10(3) UL (ref 0–0.2)
BASOPHILS NFR BLD AUTO: 0.4 %
DEPRECATED RDW RBC AUTO: 42.5 FL (ref 35.1–46.3)
EOSINOPHIL # BLD AUTO: 0.15 X10(3) UL (ref 0–0.7)
EOSINOPHIL NFR BLD AUTO: 1 %
ERYTHROCYTE [DISTWIDTH] IN BLOOD BY AUTOMATED COUNT: 12.4 % (ref 11–15)
HCT VFR BLD AUTO: 47.2 %
HGB BLD-MCNC: 16.5 G/DL
IMM GRANULOCYTES # BLD AUTO: 0.02 X10(3) UL (ref 0–1)
IMM GRANULOCYTES NFR BLD: 0.1 %
LYMPHOCYTES # BLD AUTO: 9.03 X10(3) UL (ref 1–4)
LYMPHOCYTES NFR BLD AUTO: 60.7 %
MCH RBC QN AUTO: 32.2 PG (ref 26–34)
MCHC RBC AUTO-ENTMCNC: 35 G/DL (ref 31–37)
MCV RBC AUTO: 92.2 FL
MONOCYTES # BLD AUTO: 0.66 X10(3) UL (ref 0.1–1)
MONOCYTES NFR BLD AUTO: 4.4 %
NEUTROPHILS # BLD AUTO: 4.95 X10 (3) UL (ref 1.5–7.7)
NEUTROPHILS # BLD AUTO: 4.95 X10(3) UL (ref 1.5–7.7)
NEUTROPHILS NFR BLD AUTO: 33.4 %
PLATELET # BLD AUTO: 157 10(3)UL (ref 150–450)
RBC # BLD AUTO: 5.12 X10(6)UL
WBC # BLD AUTO: 14.9 X10(3) UL (ref 4–11)

## 2024-08-28 PROCEDURE — 99214 OFFICE O/P EST MOD 30 MIN: CPT | Performed by: PHYSICIAN ASSISTANT

## 2024-08-28 NOTE — PROGRESS NOTES
Hematology Progress Note    Patient Name: Vinnie Bae Jr.   YOB: 1970   Medical Record Number: [id number]   CSN: [id number]   Consulting Physician: Reji Cunha MD  Referring Physician(s): No ref. provider found  Date of Visit: 2/28/2024    Chief Complaint:  Chronic Lymphocytic Leukemia,CLL, Ya Stage 0    History of Present Illness:     Vinnie Bae Jr. is a 54 year old male that was [facility] for evaluation of the above condition.  Patient has PMH relevant for spina bifida parents with several foot related surgical procedures presents for evaluation of lymphocytosis noted over the last month.  At the time of consultation, Vinnie reports being in his usual state of health over the last 6 months to 1 year.  This patient denies any systemic signs of illness.  He denies any bleeding or bruising.  He is without chest pain, dyspnea, nausea vomiting abdominal pain change in bowel habits urinary habits no change in skin or rash or neurological symptoms endorsed.  ROS is otherwise negative.    Interval History:  Patient returns for six month follow-up of CLL.  Vinnie reports being in his usual state of health over the last 6 months.  He is trying to lose some weight.      Denies fever, night sweats, unintentional weight loss, lymphadenopathy, abdominal pain.       Past Medical History:  Past Medical History:    Cancer (HCC)    CLL (chronic lymphocytic leukemia) (HCC)    COVID-19    HTN (hypertension)    Monoclonal B-cell lymphocytosis    Seasonal allergies    Spina bifida (HCC)       Past Surgical History:  Past Surgical History:   Procedure Laterality Date    Amputation toe,i-p jt  2011    no associated bleeding complications    Other      foot surgery, fusion    Other surgical history      multiple surgeries in feet to adjust for spina bifida       Family Medical History:  Family History   Problem Relation Age of Onset    Heart Disorder Mother     DVT/VTE Mother 70     Prostate Cancer Father 75    Cancer Paternal Uncle 63        tobacco related; likely     Cancer Paternal Uncle 68        tobacco related; likely     Cancer Paternal Uncle 65        tobacco related; likely     DVT/VTE Maternal Aunt 37    Bleeding Disorders Neg     Pancreatic Cancer Neg     Ovarian Cancer Neg     Breast Cancer Neg        Social History:  Social History     Social History Narrative    Vinnie is  to Daniela x 4 yrs. He has no children. Patient works in [employer]. He and his wife live in [place], [place].          Allergies:   Allergies   Allergen Reactions    Radiology Contrast Iodinated Dyes ANAPHYLAXIS           Sulfa Antibiotics UNKNOWN    Dander UNKNOWN     Dog, cat     Latex RASH    Mixed Feathers UNKNOWN     Down feathers    Ragweed UNKNOWN    Shellfish-Derived Products        Current Medications:   losartan 25 MG Oral Tab Take 1 tablet (25 mg total) by mouth daily. 90 tablet 0       Review of Systems:    Constitutional: Negative for anorexia, chills, fatigue, fevers, night sweats and unintentional weight loss.  Eyes: Negative for visual disturbance, irritation and redness.  Respiratory: Negative for cough, hemoptysis, chest pain, or dyspnea on exertion.  Gastrointestinal: Negative for dysphagia, odynophagia, reflux symptoms, nausea, vomiting, change in bowel habits, diarrhea, constipation and abdominal pain.  Integument/breast: Negative for rash, skin lesions, and pruritus.  Hematologic/lymphatic: Negative for easy bruising, bleeding, and lymphadenopathy.  Musculoskeletal: Negative for myalgias, arthralgias, muscle weakness, +abnormal gait secondary to spina bifida, s/p multiple foot surgeries  Neurological: Negative for headaches, dizziness, speech problems, gait problems and weakness.    A comprehensive 14 point review of systems was completed.  Pertinent positives and negatives noted in the the HPI.     Vital Signs:  BP (!) 142/92 (BP Location: Left arm, Patient Position: Sitting, Cuff  Size: adult)   Pulse 74   Temp 98.1 °F (36.7 °C) (Oral)   Resp 16   Ht 1.829 m (6')   Wt 88.9 kg (196 lb)   SpO2 96%   BMI 26.58 kg/m²     Physical Examination:    General: Patient is alert and oriented x 3, not in acute distress.  Psych: Friendly, cooperative with appropriate questions and responses  HEENT: EOMs intact. Anicteric.  Oropharynx is clear.   Neck:  No palpable lymphadenopathy. Neck is supple.  Lymphatics: There is no palpable lymphadenopathy throughout in the cervical, occipital, supraclavicular, axillary, or inguinal regions.  Chest: Clear to auscultation. No wheezes or rales.  Heart: Regular rate and rhythm. S1S2 normal.  Abdomen: Soft, non tender with good bowel sounds.  No hepatosplenomegaly.  No palpable mass.  Extremities: No edema.  Neurological: Grossly intact. Abnormal gait secondary to spina bifida      Labs:    Lab Results   Component Value Date/Time    WBC 14.9 (H) 08/27/2024 03:15 PM    RBC 5.12 08/27/2024 03:15 PM    HGB 16.5 08/27/2024 03:15 PM    HCT 47.2 08/27/2024 03:15 PM    MCV 92.2 08/27/2024 03:15 PM    MCH 32.2 08/27/2024 03:15 PM    MCHC 35.0 08/27/2024 03:15 PM    RDW 12.4 08/27/2024 03:15 PM    NEPRELIM 4.95 08/27/2024 03:15 PM    .0 08/27/2024 03:15 PM       Lab Results   Component Value Date/Time    GLU 88 08/27/2024 03:15 PM    BUN 12 08/27/2024 03:15 PM    CREATSERUM 1.04 08/27/2024 03:15 PM    GFRNAA 79 05/31/2022 12:05 PM    CA 9.7 08/27/2024 03:15 PM    ALB 4.7 08/27/2024 03:15 PM     08/27/2024 03:15 PM    K 3.9 08/27/2024 03:15 PM     08/27/2024 03:15 PM    CO2 28.0 08/27/2024 03:15 PM    ALKPHO 63 08/27/2024 03:15 PM    AST 12 08/27/2024 03:15 PM    ALT 15 08/27/2024 03:15 PM         Impression:      ICD-10-CM    1. Chronic lymphocytic leukemia (HCC)  C91.10 CBC W/DIFF [E]     COMP METABOLIC PANEL [E]     LDH [E]            54 year old M with PMH relevant for spina bifida parents with several foot related surgical procedures presents for  evaluation of lymphocytosis, now consistent with CLL    Plan:    1.) Chronic Lymphocytic Leukemia, CLL, Ya stage 0    --His current flow cytometry meets criteria for CLL    --I have reviewed with the patient that he has stage 0 disease, low risk    --Informed the patient that CLL has certain criteria which need to be met before systemic treatment is initiated  --I have provided him with a handout on CLL.   --his baseline LDH, uric acid and beta 2 microglobulin are not elevated.   --A CLL FISH panel shows no mutations detected suggestive of favorable risk disease    --No indication for immediate bone marrow biopsy at this time based on currently lab data or his H&P    --pt is clinically well today,  he has no systemic signs of illness at this time.  --he does not need treatment for his CLL based and H&P today and labs showing no signs of anemia or thrombocytopenia  --repeat follow up in 6 months with labs      2.) H/o HTN    --On Losartan 25 mg/daily  --Follow-up with PCP    3.) H/o Spina Bifida    --born with this condition; but pt is able to do some station bike exercises. Dr. Cunha discussed exercising may help with his HTN and sleep disturbance issues    MDM: Moderate Risk    Bolivar Sheth PA-C  [facility]  [facility]  [address] [place] [id number]

## 2024-09-10 ENCOUNTER — LAB ENCOUNTER (OUTPATIENT)
Dept: LAB | Age: 54
End: 2024-09-10
Attending: INTERNAL MEDICINE
Payer: COMMERCIAL

## 2024-09-10 ENCOUNTER — OFFICE VISIT (OUTPATIENT)
Dept: INTERNAL MEDICINE CLINIC | Facility: CLINIC | Age: 54
End: 2024-09-10

## 2024-09-10 VITALS
HEIGHT: 72 IN | DIASTOLIC BLOOD PRESSURE: 88 MMHG | BODY MASS INDEX: 26.95 KG/M2 | WEIGHT: 199 LBS | SYSTOLIC BLOOD PRESSURE: 140 MMHG | TEMPERATURE: 98 F | HEART RATE: 92 BPM

## 2024-09-10 DIAGNOSIS — Z12.11 ENCOUNTER FOR SCREENING COLONOSCOPY: ICD-10-CM

## 2024-09-10 DIAGNOSIS — Z82.49 FAMILY HISTORY OF EARLY CAD: ICD-10-CM

## 2024-09-10 DIAGNOSIS — G60.9 IDIOPATHIC PERIPHERAL NEUROPATHY: ICD-10-CM

## 2024-09-10 DIAGNOSIS — Q05.7 SPINA BIFIDA OF LUMBAR REGION WITHOUT HYDROCEPHALUS (HCC): ICD-10-CM

## 2024-09-10 DIAGNOSIS — Z00.00 PHYSICAL EXAM: Primary | ICD-10-CM

## 2024-09-10 DIAGNOSIS — R03.0 ELEVATED BLOOD PRESSURE READING: ICD-10-CM

## 2024-09-10 DIAGNOSIS — Z98.1 H/O ANKLE FUSION: ICD-10-CM

## 2024-09-10 DIAGNOSIS — D72.820 MONOCLONAL B-CELL LYMPHOCYTOSIS: ICD-10-CM

## 2024-09-10 DIAGNOSIS — L84 CALLUS OF FOOT: ICD-10-CM

## 2024-09-10 DIAGNOSIS — Z87.2 HISTORY OF CELLULITIS: ICD-10-CM

## 2024-09-10 DIAGNOSIS — Z00.00 PHYSICAL EXAM: ICD-10-CM

## 2024-09-10 DIAGNOSIS — C91.10 CHRONIC LYMPHOCYTIC LEUKEMIA (HCC): ICD-10-CM

## 2024-09-10 DIAGNOSIS — I10 HYPERTENSION, UNSPECIFIED TYPE: ICD-10-CM

## 2024-09-10 PROBLEM — L97.522 ULCER OF LEFT FOOT, WITH FAT LAYER EXPOSED (HCC): Status: RESOLVED | Noted: 2018-11-09 | Resolved: 2024-09-10

## 2024-09-10 LAB
ATRIAL RATE: 92 BPM
BILIRUB UR QL: NEGATIVE
CHOLEST SERPL-MCNC: 201 MG/DL (ref ?–200)
CLARITY UR: CLEAR
COLOR UR: COLORLESS
COMPLEXED PSA SERPL-MCNC: 1.65 NG/ML (ref ?–4)
EST. AVERAGE GLUCOSE BLD GHB EST-MCNC: 105 MG/DL (ref 68–126)
FASTING PATIENT LIPID ANSWER: YES
GLUCOSE UR-MCNC: NORMAL MG/DL
HBA1C MFR BLD: 5.3 % (ref ?–5.7)
HDLC SERPL-MCNC: 46 MG/DL (ref 40–59)
HGB UR QL STRIP.AUTO: NEGATIVE
KETONES UR-MCNC: NEGATIVE MG/DL
LDLC SERPL CALC-MCNC: 138 MG/DL (ref ?–100)
LEUKOCYTE ESTERASE UR QL STRIP.AUTO: NEGATIVE
NITRITE UR QL STRIP.AUTO: NEGATIVE
NONHDLC SERPL-MCNC: 155 MG/DL (ref ?–130)
P AXIS: 59 DEGREES
P-R INTERVAL: 158 MS
PH UR: 6.5 [PH] (ref 5–8)
PROT UR-MCNC: NEGATIVE MG/DL
Q-T INTERVAL: 362 MS
QRS DURATION: 100 MS
QTC CALCULATION (BEZET): 447 MS
R AXIS: 56 DEGREES
SP GR UR STRIP: 1.01 (ref 1–1.03)
T AXIS: 10 DEGREES
TRIGL SERPL-MCNC: 95 MG/DL (ref 30–149)
TSI SER-ACNC: 1.37 MIU/ML (ref 0.55–4.78)
UROBILINOGEN UR STRIP-ACNC: NORMAL
VENTRICULAR RATE: 92 BPM
VIT D+METAB SERPL-MCNC: 11 NG/ML (ref 30–100)
VLDLC SERPL CALC-MCNC: 17 MG/DL (ref 0–30)

## 2024-09-10 PROCEDURE — 36415 COLL VENOUS BLD VENIPUNCTURE: CPT

## 2024-09-10 PROCEDURE — 83036 HEMOGLOBIN GLYCOSYLATED A1C: CPT

## 2024-09-10 PROCEDURE — 84410 TESTOSTERONE BIOAVAILABLE: CPT

## 2024-09-10 PROCEDURE — 80061 LIPID PANEL: CPT

## 2024-09-10 PROCEDURE — 84443 ASSAY THYROID STIM HORMONE: CPT

## 2024-09-10 PROCEDURE — 82306 VITAMIN D 25 HYDROXY: CPT

## 2024-09-10 PROCEDURE — 81003 URINALYSIS AUTO W/O SCOPE: CPT | Performed by: INTERNAL MEDICINE

## 2024-09-10 RX ORDER — LOSARTAN POTASSIUM 50 MG/1
50 TABLET ORAL DAILY
Qty: 90 TABLET | Refills: 2 | Status: SHIPPED | OUTPATIENT
Start: 2024-09-10

## 2024-09-10 NOTE — PATIENT INSTRUCTIONS
1. Physical exam  Physical exam instruction: Improve diet and exercise, complete fasting labs in the near future and you will be called with results 5-7 days after completed, call with questions.  Call the central scheduling number at [phone] to schedule at any of the [facility] locations    - Hemoglobin A1C; Future  - Lipid Panel; Future  - Testosterone,Total and Weakly Bound w/ SHBG; Future  - TSH W Reflex To Free T4; Future  - Urinalysis with Culture Reflex  - Vitamin D; Future  - PSA Total, Screen; Future    2. H/O ankle fusion  Stable continue current monitoring and management    3. Spina bifida of lumbar region without hydrocephalus (HCC)  Continue current monitoring management    4. Chronic lymphocytic leukemia (HCC)  Stable lets follow-up with Dr. Cunha, serial monitoring of lab work, and reporting any symptoms    5. Monoclonal B-cell lymphocytosis  Stable continue current monitoring management    6. Callus of foot  Stable continue current monitoring management    7. History of cellulitis  Stable continue current monitoring management    8. Family history of early CAD  I like the idea of the EKG today, and the calcium CT scoring test, lets complete this and I will notify you with results, be patient with the scheduling for the CT calcium test, they are few months back logged  - ELECTROCARDIOGRAM, COMPLETE: Sinus rhythm at 92 beats minute, no ST-T wave changes, normal EKG  - CT CALCIUM SCORING; Future    9. Encounter for screening colonoscopy  Stable continue current monitoring management lets follow-up with a gastroenterologist consider the Cologuard as well  - Gastro Referral - In Network    10. Elevated blood pressure reading  Stable, see below    11. Hypertension, unspecified type  I like the idea of going up on the losartan to 50 mg, and doing the cardiac testing  - losartan 50 MG Oral Tab; Take 1 tablet (50 mg total) by mouth daily.  Dispense: 90 tablet; Refill: 2    12. Idiopathic peripheral  neuropathy  Referral placed, follow-up with Podiatry  - Podiatry Referral - In Network

## 2024-09-10 NOTE — PROGRESS NOTES
Vinnie Bae Jr. is a 54 year old male who presents for a complete physical exam.   HPI:   Pt complains of:    Chief Complaint   Patient presents with    Establish Care     Patient is here today for physical exam, patient is up-to-date with age-related standard of care screening and vaccination recommendations, this was discussed at length and they verbalized understanding of any deficiencies.    CLL: Patient has a history of indolent CLL recently discovered, he follows with Dr. Cunha, he is on blood work surveillance, monitoring and management for now, claims he is feeling well    History of spinal bifida, and left fused ankle: Patient claims to have some skeletal abnormalities in which he had surgery as a young child, seems to stabilize his spine, his gait is always been abnormal.    Hypertension: Patient was discovered to have hypertension 1 to 2 years ago, was placed on low-dose losartan, claims to be compliant with that, but does have an elevated heart rate when he lays flat.  Elevated blood pressure in the office today.  Rechecked 140/88    Wt Readings from Last 3 Encounters:   09/10/24 199 lb (90.3 kg)   08/28/24 196 lb (88.9 kg)   02/28/24 201 lb 3.2 oz (91.3 kg)       Current Outpatient Medications   Medication Sig Dispense Refill    losartan 50 MG Oral Tab Take 1 tablet (50 mg total) by mouth daily. 90 tablet 2      Past Medical History:    Cancer (HCC)    CLL (chronic lymphocytic leukemia) (HCC)    COVID-19    HTN (hypertension)    Monoclonal B-cell lymphocytosis    Seasonal allergies    Spina bifida (HCC)      Past Surgical History:   Procedure Laterality Date    Amputation toe,i-p jt  2011    no associated bleeding complications    Other      foot surgery, fusion    Other surgical history      multiple surgeries in feet to adjust for spina bifida      Family History   Problem Relation Age of Onset    Heart Disorder Mother     DVT/VTE Mother 70    Prostate Cancer Father 75    Cancer Paternal Uncle  63        tobacco related; likely     Cancer Paternal Uncle 68        tobacco related; likely     Cancer Paternal Uncle 65        tobacco related; likely     DVT/VTE Maternal Aunt 37    Bleeding Disorders Neg     Pancreatic Cancer Neg     Ovarian Cancer Neg     Breast Cancer Neg       Social History:  Social History     Socioeconomic History    Marital status:    Tobacco Use    Smoking status: Never    Smokeless tobacco: Never   Vaping Use    Vaping status: Never Used   Substance and Sexual Activity    Alcohol use: Not Currently     Comment: rare social use    Drug use: No   Social History Narrative    Vinnie is  to Daniela x 4 yrs. He has no children. Patient works in [employer]. He and his wife live in [place], [place].          REVIEW OF SYSTEMS:   REVIEW OF SYSTEMS:  Constitutional: Negative for Chills, fatigue, fever, malaise, weight gain and weight loss.  ENMT: Negative for Nasal drainage and sinus pressure.  Eyes: Negative for Vision changes.  Respiratory: Negative for Cough, dyspnea and wheezing.  Cardio: Negative Chest pain and irregular heartbeat/palpitations.  GI: Negative for Abdominal pain, constipation, diarrhea, heartburn, nausea and vomiting.  : Negative for Dysuria and urinary frequency.  Endocrine: Negative for Cold intolerance and heat intolerance.  Neuro: Negative for Gait disturbance and memory impairment.  Psych: Negative for Anxiety and depression.  Integumentary: Negative for Hives and rash.  MS: Negative muscle weakness. negative for joint pain  Hema/Lymph: Negative Easy bleeding and easy bruising.  Allergic/Immuno: Negative Environmental allergies and food allergies.    EXAM:   BP (!) 152/94   Pulse 92   Temp 98 °F (36.7 °C) (Oral)   Ht 6' (1.829 m)   Wt 199 lb (90.3 kg)   BMI 26.99 kg/m²   Body mass index is 26.99 kg/m².   PHYSICAL EXAMINATION:  Vital signs: See chart   Gen. exam: Alert and oriented, in no acute distress   HEENT: Pupils equal and reactive to light and  accommodation, moist mucous membranes  Neck exam:  Supple with baseline range of motion.  Normal thyroid trachea midline, no JVD  Heart exam: Regular rate and rhythm no murmurs no S3 no S4   Lung exam: No rales no rhonchi no wheezes  Abdominal exam: Soft nontender nondistended positive bowel sounds are normoactive  Extremities exam: no clubbing no cyanosis no edema  Skin exam: No obvious wounds, no rashes  Neurological exam: Cranial nerves II through XII intact, no gross deficits  Musculoskeletal exam: No arthritis appreciated, left leg varus appearance, with outturned left ankle, fixed, appears to be at baseline  : prostate not examined    ASSESSMENT AND PLAN:   Vinnie Bae Jr. is a 54 year old male who presents for a complete physical exam.  1. Physical exam  Physical exam instruction: Improve diet and exercise, complete fasting labs in the near future and you will be called with results 5-7 days after completed, call with questions.  Call the central scheduling number at [phone] to schedule at any of the [facility] locations    - Hemoglobin A1C; Future  - Lipid Panel; Future  - Testosterone,Total and Weakly Bound w/ SHBG; Future  - TSH W Reflex To Free T4; Future  - Urinalysis with Culture Reflex  - Vitamin D; Future  - PSA Total, Screen; Future    2. H/O ankle fusion  Stable continue current monitoring and management    3. Spina bifida of lumbar region without hydrocephalus (HCC)  Continue current monitoring management    4. Chronic lymphocytic leukemia (HCC)  Stable lets follow-up with Dr. Cunha, serial monitoring of lab work, and reporting any symptoms    5. Monoclonal B-cell lymphocytosis  Stable continue current monitoring management    6. Callus of foot  Stable continue current monitoring management    7. History of cellulitis  Stable continue current monitoring management    8. Family history of early CAD  I like the idea of the EKG today, and the calcium CT scoring test, lets complete this  and I will notify you with results, be patient with the scheduling for the CT calcium test, they are few months back logged  - ELECTROCARDIOGRAM, COMPLETE: Sinus rhythm at 92 beats minute, no ST-T wave changes, normal EKG  - CT CALCIUM SCORING; Future    9. Encounter for screening colonoscopy  Stable continue current monitoring management lets follow-up with a gastroenterologist consider the Cologuard as well  - Gastro Referral - In Network    10. Elevated blood pressure reading  Stable, see below    11. Hypertension, unspecified type  I like the idea of going up on the losartan to 50 mg, and doing the cardiac testing  - losartan 50 MG Oral Tab; Take 1 tablet (50 mg total) by mouth daily.  Dispense: 90 tablet; Refill: 2    12. Idiopathic peripheral neuropathy  Referral placed, follow-up with Podiatry  - Podiatry Referral - In Network        Spent 45 minutes obtaining history, evaluating patient, discussing treatment options, diet, exercise, review of available labs and radiology reports, and completing documentation.    Jeff Anthony D'Amico,   9/10/2024  4:19 PM

## 2024-09-17 LAB
SEX HORM BIND GLOB: 53.3 NMOL/L
TESTOST % FREE+WEAK BND: 14.9 %
TESTOST FREE+WEAK BND: 60.4 NG/DL
TESTOSTERONE TOT /MS: 405.5 NG/DL

## 2024-10-20 NOTE — LETTER
10/02/21      Benosn Mcclellan  :  8/10/1970    To Whom It May Concern: This patient was admitted to [facility] on 21 and received medical treatment. He will not be able to go to work as his left leg will have to remain elevated.  At t normal (ped)...

## 2025-01-02 DIAGNOSIS — I10 HYPERTENSION, UNSPECIFIED TYPE: ICD-10-CM

## 2025-01-02 RX ORDER — LOSARTAN POTASSIUM 50 MG/1
50 TABLET ORAL DAILY
Qty: 90 TABLET | Refills: 2 | Status: CANCELLED | OUTPATIENT
Start: 2025-01-02

## 2025-01-02 NOTE — TELEPHONE ENCOUNTER
Pt has refills on file     Current refill request refused due to refill is either a duplicate request or has active refills at the pharmacy.  Check previous templates.    Requested Prescriptions     Pending Prescriptions Disp Refills    losartan 50 MG Oral Tab 90 tablet 2     Sig: Take 1 tablet (50 mg total) by mouth daily.

## 2025-02-26 ENCOUNTER — LAB ENCOUNTER (OUTPATIENT)
Dept: LAB | Facility: HOSPITAL | Age: 55
End: 2025-02-26
Attending: PHYSICIAN ASSISTANT
Payer: COMMERCIAL

## 2025-02-26 DIAGNOSIS — C91.10 CHRONIC LYMPHOCYTIC LEUKEMIA (HCC): ICD-10-CM

## 2025-02-26 LAB
ALBUMIN SERPL-MCNC: 4.8 G/DL (ref 3.2–4.8)
ALBUMIN/GLOB SERPL: 2 {RATIO} (ref 1–2)
ALP LIVER SERPL-CCNC: 67 U/L
ALT SERPL-CCNC: 18 U/L
ANION GAP SERPL CALC-SCNC: 8 MMOL/L (ref 0–18)
AST SERPL-CCNC: 14 U/L (ref ?–34)
BASOPHILS # BLD: 0 X10(3) UL (ref 0–0.2)
BASOPHILS NFR BLD: 0 %
BILIRUB SERPL-MCNC: 0.8 MG/DL (ref 0.3–1.2)
BUN BLD-MCNC: 14 MG/DL (ref 9–23)
BUN/CREAT SERPL: 13.7 (ref 10–20)
CALCIUM BLD-MCNC: 9.4 MG/DL (ref 8.7–10.4)
CHLORIDE SERPL-SCNC: 104 MMOL/L (ref 98–112)
CO2 SERPL-SCNC: 30 MMOL/L (ref 21–32)
CREAT BLD-MCNC: 1.02 MG/DL
DEPRECATED RDW RBC AUTO: 42.2 FL (ref 35.1–46.3)
EGFRCR SERPLBLD CKD-EPI 2021: 86 ML/MIN/1.73M2 (ref 60–?)
EOSINOPHIL # BLD: 0.31 X10(3) UL (ref 0–0.7)
EOSINOPHIL NFR BLD: 2 %
ERYTHROCYTE [DISTWIDTH] IN BLOOD BY AUTOMATED COUNT: 12.2 % (ref 11–15)
FASTING STATUS PATIENT QL REPORTED: NO
GLOBULIN PLAS-MCNC: 2.4 G/DL (ref 2–3.5)
GLUCOSE BLD-MCNC: 93 MG/DL (ref 70–99)
HCT VFR BLD AUTO: 46.6 %
HGB BLD-MCNC: 16.1 G/DL
LDH SERPL L TO P-CCNC: 137 U/L
LYMPHOCYTES NFR BLD: 62 %
LYMPHOCYTES NFR BLD: 9.86 X10(3) UL (ref 1–4)
MCH RBC QN AUTO: 32.2 PG (ref 26–34)
MCHC RBC AUTO-ENTMCNC: 34.5 G/DL (ref 31–37)
MCV RBC AUTO: 93.2 FL
MONOCYTES # BLD: 0.62 X10(3) UL (ref 0.1–1)
MONOCYTES NFR BLD: 4 %
MORPHOLOGY: NORMAL
NEUTROPHILS # BLD AUTO: 4.4 X10 (3) UL (ref 1.5–7.7)
NEUTROPHILS NFR BLD: 30 %
NEUTS HYPERSEG # BLD: 4.62 X10(3) UL (ref 1.5–7.7)
OSMOLALITY SERPL CALC.SUM OF ELEC: 294 MOSM/KG (ref 275–295)
PLATELET # BLD AUTO: 146 10(3)UL (ref 150–450)
PLATELET MORPHOLOGY: NORMAL
POTASSIUM SERPL-SCNC: 4.5 MMOL/L (ref 3.5–5.1)
PROT SERPL-MCNC: 7.2 G/DL (ref 5.7–8.2)
RBC # BLD AUTO: 5 X10(6)UL
SODIUM SERPL-SCNC: 142 MMOL/L (ref 136–145)
TOTAL CELLS COUNTED BLD: 100
VARIANT LYMPHS NFR BLD MANUAL: 2 %
WBC # BLD AUTO: 15.4 X10(3) UL (ref 4–11)

## 2025-02-26 PROCEDURE — 85027 COMPLETE CBC AUTOMATED: CPT

## 2025-02-26 PROCEDURE — 36415 COLL VENOUS BLD VENIPUNCTURE: CPT

## 2025-02-26 PROCEDURE — 85060 BLOOD SMEAR INTERPRETATION: CPT

## 2025-02-26 PROCEDURE — 80053 COMPREHEN METABOLIC PANEL: CPT

## 2025-02-26 PROCEDURE — 85025 COMPLETE CBC W/AUTO DIFF WBC: CPT

## 2025-02-26 PROCEDURE — 85007 BL SMEAR W/DIFF WBC COUNT: CPT

## 2025-02-26 PROCEDURE — 83615 LACTATE (LD) (LDH) ENZYME: CPT

## 2025-02-27 ENCOUNTER — OFFICE VISIT (OUTPATIENT)
Age: 55
End: 2025-02-27
Attending: INTERNAL MEDICINE
Payer: COMMERCIAL

## 2025-02-27 VITALS
TEMPERATURE: 97 F | HEART RATE: 77 BPM | DIASTOLIC BLOOD PRESSURE: 89 MMHG | SYSTOLIC BLOOD PRESSURE: 161 MMHG | WEIGHT: 198 LBS | BODY MASS INDEX: 26.82 KG/M2 | HEIGHT: 72 IN | RESPIRATION RATE: 18 BRPM | OXYGEN SATURATION: 95 %

## 2025-02-27 DIAGNOSIS — C91.10 CHRONIC LYMPHOCYTIC LEUKEMIA (HCC): Primary | ICD-10-CM

## 2025-02-27 DIAGNOSIS — Z87.728 HISTORY OF SPINA BIFIDA: ICD-10-CM

## 2025-02-27 DIAGNOSIS — I10 HYPERTENSION, UNSPECIFIED TYPE: ICD-10-CM

## 2025-02-27 DIAGNOSIS — D69.6 THROMBOCYTOPENIA: ICD-10-CM

## 2025-02-27 NOTE — PROGRESS NOTES
Hematology Progress Note    Patient Name: Vinnie Bae Jr.   YOB: 1970   Medical Record Number: [id number]   CSN: [id number]   Consulting Physician: Reji Cunha MD  Referring Physician(s): No ref. provider found  Date of Visit: 2/27/2025    Chief Complaint:  Chronic Lymphocytic Leukemia,CLL, Ya Stage 0    History of Present Illness:     Vinnie Bae Jr. is a 54 year old male that [facility] for evaluation of the above condition.  Patient has PMH relevant for spina bifida parents with several foot related surgical procedures presents for evaluation of lymphocytosis noted over the last month.  At the time of consultation, Vinnie reports being in his usual state of health over the last 6 months to 1 year.  This patient denies any systemic signs of illness.  He denies any bleeding or bruising.  He is without chest pain, dyspnea, nausea vomiting abdominal pain change in bowel habits urinary habits no change in skin or rash or neurological symptoms endorsed.  ROS is otherwise negative.    Interval History:  Patient returns for six month follow-up of CLL.  Vinnie reports being in his usual state of health over the last 6 months.  He denies any new medications in light of the thrombocytopenia.      Denies fever, night sweats, unintentional weight loss, lymphadenopathy, abdominal pain.       Past Medical History:  Past Medical History:    Cancer (HCC)    CLL (chronic lymphocytic leukemia) (HCC)    COVID-19    HTN (hypertension)    Monoclonal B-cell lymphocytosis    Seasonal allergies    Spina bifida (HCC)       Past Surgical History:  Past Surgical History:   Procedure Laterality Date    Amputation toe,i-p jt  2011    no associated bleeding complications    Other      foot surgery, fusion    Other surgical history      multiple surgeries in feet to adjust for spina bifida       Family Medical History:  Family History   Problem Relation Age of Onset    Heart Disorder Mother      DVT/VTE Mother 70    Prostate Cancer Father 75    Cancer Paternal Uncle 63        tobacco related; likely     Cancer Paternal Uncle 68        tobacco related; likely     Cancer Paternal Uncle 65        tobacco related; likely     DVT/VTE Maternal Aunt 37    Bleeding Disorders Neg     Pancreatic Cancer Neg     Ovarian Cancer Neg     Breast Cancer Neg        Social History:  Social History     Social History Narrative    Vinnie is  to Daniela x 4 yrs. He has no children. Patient works in [employer]. He and his wife live in [place], [place].          Allergies:   Allergies   Allergen Reactions    Radiology Contrast Iodinated Dyes ANAPHYLAXIS           Sulfa Antibiotics UNKNOWN    Dander UNKNOWN     Dog, cat     Latex RASH    Mixed Feathers UNKNOWN     Down feathers    Ragweed UNKNOWN    Shellfish-Derived Products        Current Medications:   losartan 50 MG Oral Tab Take 1 tablet (50 mg total) by mouth daily. 90 tablet 2       Review of Systems:    Constitutional: Negative for anorexia, chills, fatigue, fevers, night sweats and unintentional weight loss.  Eyes: Negative for visual disturbance, irritation and redness.  Respiratory: Negative for cough, hemoptysis, chest pain, or dyspnea on exertion.  Gastrointestinal: Negative for dysphagia, odynophagia, reflux symptoms, nausea, vomiting, change in bowel habits, diarrhea, constipation and abdominal pain.  Integument/breast: Negative for rash, skin lesions, and pruritus.  Hematologic/lymphatic: Negative for easy bruising, bleeding, and lymphadenopathy.  Musculoskeletal: Negative for myalgias, arthralgias, muscle weakness, +abnormal gait secondary to spina bifida, s/p multiple foot surgeries  Neurological: Negative for headaches, dizziness, speech problems, gait problems and weakness.    A comprehensive 14 point review of systems was completed.  Pertinent positives and negatives noted in the the HPI.     Vital Signs:  BP (!) 161/89 (BP Location: Left arm, Patient  Position: Sitting, Cuff Size: large)   Pulse 77   Temp 97.2 °F (36.2 °C) (Tympanic)   Resp 18   Ht 1.829 m (6')   Wt 89.8 kg (198 lb)   SpO2 95%   BMI 26.85 kg/m²     Physical Examination:    General: Patient is alert and oriented x 3, not in acute distress.  Psych: Friendly, cooperative with appropriate questions and responses  HEENT: EOMs intact. Anicteric.  Oropharynx is clear.   Neck:  No palpable lymphadenopathy. Neck is supple.  Lymphatics: There is no palpable lymphadenopathy throughout in the cervical, occipital, supraclavicular, axillary, or inguinal regions.  Chest: Clear to auscultation. No wheezes or rales.  Heart: Regular rate and rhythm. S1S2 normal.  Abdomen: Soft, non tender with good bowel sounds.  No hepatosplenomegaly.  No palpable mass.  Extremities: No edema.  Neurological: Grossly intact. Abnormal gait secondary to spina bifida      Labs:    Lab Results   Component Value Date/Time    WBC 15.4 (H) 02/26/2025 02:47 PM    RBC 5.00 02/26/2025 02:47 PM    HGB 16.1 02/26/2025 02:47 PM    HCT 46.6 02/26/2025 02:47 PM    MCV 93.2 02/26/2025 02:47 PM    MCH 32.2 02/26/2025 02:47 PM    MCHC 34.5 02/26/2025 02:47 PM    RDW 12.2 02/26/2025 02:47 PM    NEPRELIM 4.40 02/26/2025 02:47 PM    .0 (L) 02/26/2025 02:47 PM       Lab Results   Component Value Date/Time    GLU 93 02/26/2025 02:47 PM    BUN 14 02/26/2025 02:47 PM    CREATSERUM 1.02 02/26/2025 02:47 PM    GFRNAA 79 05/31/2022 12:05 PM    CA 9.4 02/26/2025 02:47 PM    ALB 4.8 02/26/2025 02:47 PM     02/26/2025 02:47 PM    K 4.5 02/26/2025 02:47 PM     02/26/2025 02:47 PM    CO2 30.0 02/26/2025 02:47 PM    ALKPHO 67 02/26/2025 02:47 PM    AST 14 02/26/2025 02:47 PM    ALT 18 02/26/2025 02:47 PM         Impression:    No diagnosis found.        54 year old M with PMH relevant for spina bifida parents with several foot related surgical procedures presents for evaluation of lymphocytosis, now consistent with CLL    Plan:    1.)  Chronic Lymphocytic Leukemia, CLL, Ya stage 0    --His current flow cytometry meets criteria for CLL    --I have reviewed with the patient that he has stage 0 disease, low risk    --Informed the patient that CLL has certain criteria which need to be met before systemic treatment is initiated  --I have provided him with a handout on CLL.   --his baseline LDH, uric acid and beta 2 microglobulin are not elevated.   --A CLL FISH panel shows no mutations detected suggestive of favorable risk disease    --No indication for immediate bone marrow biopsy at this time based on currently lab data or his H&P    --pt is clinically well today,  he has no systemic signs of illness at this time.  --he does not need treatment for his CLL based and H&P today and labs showing no signs of anemia    --he has some MILD thrombocytopenia, but value is not <100K  --repeat follow up in 6 months with labs      2.) H/o HTN    --On Losartan 25 mg/daily; evaluated values noted above  --rec Follow-up with PCP    3.) H/o Spina Bifida    --born with this condition; but pt is able to do some station bike exercises    MDM: Moderate Risk    Reji Cunha MD  [facility]  [address] [facility] [place]  [address]  [phone]

## 2025-07-09 DIAGNOSIS — I10 HYPERTENSION, UNSPECIFIED TYPE: ICD-10-CM

## 2025-07-14 RX ORDER — LOSARTAN POTASSIUM 50 MG/1
50 TABLET ORAL DAILY
Qty: 90 TABLET | Refills: 0 | Status: SHIPPED | OUTPATIENT
Start: 2025-07-14

## 2025-07-14 NOTE — TELEPHONE ENCOUNTER
For replies, please route to pool: [facility] CENTRAL REFILLS    Please review: medication fails/has no protocol attached.  No future appointments with primary care medicine     This patient has been reassigned to your care. Previous patient of [facility] ([facility]) - Dr. Jeff Anthony D'Amico    Former [facility] patient.  [facility] Establish Care CorrectNethart message sent to patient.

## 2025-08-26 ENCOUNTER — TELEPHONE (OUTPATIENT)
Facility: LOCATION | Age: 55
End: 2025-08-26

## 2025-08-28 ENCOUNTER — APPOINTMENT (OUTPATIENT)
Facility: LOCATION | Age: 55
End: 2025-08-28
Attending: INTERNAL MEDICINE

## (undated) DIAGNOSIS — C91.10 CLL (CHRONIC LYMPHOCYTIC LEUKEMIA) (HCC): Primary | ICD-10-CM

## (undated) NOTE — LETTER
[facility] [place]  [address] [id number]  Dept: [phone]  Dept Fax: [phone]         June 21, 2017    Patient: Gina Sevilla   YOB: 1970   Date of Visit: 6/21/2017       To Whom It May Concern:    Antoine Hayes

## (undated) NOTE — LETTER
[address] [facility] [address], [address]              2/28/2020        [address] [facility] [address]        Dear Patient,     According to our records,

## (undated) NOTE — LETTER
10/02/21          Sarahi Harris  :  8/10/1970      To Whom It May Concern: This patient was admitted to [facility] on 21 and received medical treatment.  He will be able to work from home and can operate a laptop for work related du

## (undated) NOTE — ED AVS SNAPSHOT
[facility] [place] [address] [address]    Phone:  [phone]    Fax:  [phone]   MRN: [id number]    Department:  [facility] [place]   Date of Visit:  6/21/2017           Isaías may not be covered by your plan. Please contact your insurance company to determine coverage for follow-up care and referrals. [facility]  [address] [facility], [address] [place] [address]  [phone] [address] prescription right away and begin taking the medication(s) as directed. If the Immediate Care Provider has read X-rays, these will be re-interpreted by a radiologist.  If there is a significant change in your reading, you will be contacted.  Please make Medicaid plans. To get signed up and covered, please call [phone] and ask to get set up for an insurance coverage that is in-network with [employer].         Imaging Results         XR FOOT, COMPLETE (MIN 3 VIEWS), LEFT (CPT=[id number]) (Mary information, go to [email or web address] and click on the Sign Up Now link in the [employer] box. Enter your [employer] Activation Code exactly as it appears below along with your Zip Code and Date of Birth to complete the sign-up process.  If you do

## (undated) NOTE — Clinical Note
Patient BP elevated after resting in room for 10 mins   Please advise   Also, Patient wants to know lab results and still needs Referral for Podiatry